# Patient Record
Sex: MALE | Race: WHITE | NOT HISPANIC OR LATINO | Employment: FULL TIME | URBAN - METROPOLITAN AREA
[De-identification: names, ages, dates, MRNs, and addresses within clinical notes are randomized per-mention and may not be internally consistent; named-entity substitution may affect disease eponyms.]

---

## 2016-01-12 LAB
LEFT EYE DIABETIC RETINOPATHY: NORMAL
RIGHT EYE DIABETIC RETINOPATHY: NORMAL

## 2017-11-24 LAB
LEFT EYE DIABETIC RETINOPATHY: NORMAL
RIGHT EYE DIABETIC RETINOPATHY: NORMAL

## 2018-01-02 ENCOUNTER — HOSPITAL ENCOUNTER (EMERGENCY)
Facility: HOSPITAL | Age: 58
Discharge: HOME | End: 2018-01-02
Attending: EMERGENCY MEDICINE | Admitting: EMERGENCY MEDICINE
Payer: COMMERCIAL

## 2018-01-02 ENCOUNTER — APPOINTMENT (EMERGENCY)
Dept: RADIOLOGY | Facility: HOSPITAL | Age: 58
End: 2018-01-02
Payer: COMMERCIAL

## 2018-01-02 VITALS
BODY MASS INDEX: 26.95 KG/M2 | HEART RATE: 89 BPM | OXYGEN SATURATION: 99 % | DIASTOLIC BLOOD PRESSURE: 86 MMHG | HEIGHT: 74 IN | WEIGHT: 210 LBS | RESPIRATION RATE: 20 BRPM | TEMPERATURE: 97.9 F | SYSTOLIC BLOOD PRESSURE: 142 MMHG

## 2018-01-02 DIAGNOSIS — J06.9 URI (UPPER RESPIRATORY INFECTION): ICD-10-CM

## 2018-01-02 DIAGNOSIS — S09.90XA CLOSED HEAD INJURY: ICD-10-CM

## 2018-01-02 DIAGNOSIS — S02.2XXA NASAL BONE FRACTURES: Primary | ICD-10-CM

## 2018-01-02 DIAGNOSIS — R55 SYNCOPE: ICD-10-CM

## 2018-01-02 LAB
ANION GAP SERPL CALCULATED.3IONS-SCNC: 9 MMOL/L (ref 4–13)
ATRIAL RATE: 84 BPM
BASOPHILS # BLD AUTO: 0.04 THOUSANDS/ΜL (ref 0–0.1)
BASOPHILS NFR BLD AUTO: 0 % (ref 0–1)
BUN SERPL-MCNC: 13 MG/DL (ref 5–25)
CALCIUM SERPL-MCNC: 9.8 MG/DL (ref 8.3–10.1)
CHLORIDE SERPL-SCNC: 99 MMOL/L (ref 100–108)
CO2 SERPL-SCNC: 26 MMOL/L (ref 21–32)
CREAT SERPL-MCNC: 1.08 MG/DL (ref 0.6–1.3)
EOSINOPHIL # BLD AUTO: 0.1 THOUSAND/ΜL (ref 0–0.61)
EOSINOPHIL NFR BLD AUTO: 1 % (ref 0–6)
ERYTHROCYTE [DISTWIDTH] IN BLOOD BY AUTOMATED COUNT: 13.3 % (ref 11.6–15.1)
GFR SERPL CREATININE-BSD FRML MDRD: 77 ML/MIN/1.73SQ M
GLUCOSE SERPL-MCNC: 161 MG/DL (ref 65–140)
HCT VFR BLD AUTO: 42.7 % (ref 36.5–49.3)
HGB BLD-MCNC: 15.1 G/DL (ref 12–17)
LYMPHOCYTES # BLD AUTO: 1.66 THOUSANDS/ΜL (ref 0.6–4.47)
LYMPHOCYTES NFR BLD AUTO: 15 % (ref 14–44)
MCH RBC QN AUTO: 31.3 PG (ref 26.8–34.3)
MCHC RBC AUTO-ENTMCNC: 35.4 G/DL (ref 31.4–37.4)
MCV RBC AUTO: 89 FL (ref 82–98)
MONOCYTES # BLD AUTO: 0.92 THOUSAND/ΜL (ref 0.17–1.22)
MONOCYTES NFR BLD AUTO: 9 % (ref 4–12)
NEUTROPHILS # BLD AUTO: 8.04 THOUSANDS/ΜL (ref 1.85–7.62)
NEUTS SEG NFR BLD AUTO: 75 % (ref 43–75)
NRBC BLD AUTO-RTO: 0 /100 WBCS
P AXIS: 47 DEGREES
PLATELET # BLD AUTO: 266 THOUSANDS/UL (ref 149–390)
PMV BLD AUTO: 9.7 FL (ref 8.9–12.7)
POTASSIUM SERPL-SCNC: 4.2 MMOL/L (ref 3.5–5.3)
PR INTERVAL: 146 MS
QRS AXIS: 69 DEGREES
QRSD INTERVAL: 86 MS
QT INTERVAL: 336 MS
QTC INTERVAL: 397 MS
RBC # BLD AUTO: 4.82 MILLION/UL (ref 3.88–5.62)
SODIUM SERPL-SCNC: 134 MMOL/L (ref 136–145)
T WAVE AXIS: 35 DEGREES
VENTRICULAR RATE: 84 BPM
WBC # BLD AUTO: 10.78 THOUSAND/UL (ref 4.31–10.16)

## 2018-01-02 PROCEDURE — 70486 CT MAXILLOFACIAL W/O DYE: CPT

## 2018-01-02 PROCEDURE — 71046 X-RAY EXAM CHEST 2 VIEWS: CPT

## 2018-01-02 PROCEDURE — 93005 ELECTROCARDIOGRAM TRACING: CPT | Performed by: EMERGENCY MEDICINE

## 2018-01-02 PROCEDURE — 84484 ASSAY OF TROPONIN QUANT: CPT

## 2018-01-02 PROCEDURE — 85025 COMPLETE CBC W/AUTO DIFF WBC: CPT | Performed by: EMERGENCY MEDICINE

## 2018-01-02 PROCEDURE — 93005 ELECTROCARDIOGRAM TRACING: CPT

## 2018-01-02 PROCEDURE — 36415 COLL VENOUS BLD VENIPUNCTURE: CPT | Performed by: EMERGENCY MEDICINE

## 2018-01-02 PROCEDURE — 80048 BASIC METABOLIC PNL TOTAL CA: CPT | Performed by: EMERGENCY MEDICINE

## 2018-01-02 PROCEDURE — 99285 EMERGENCY DEPT VISIT HI MDM: CPT

## 2018-01-02 PROCEDURE — 96361 HYDRATE IV INFUSION ADD-ON: CPT

## 2018-01-02 PROCEDURE — 96360 HYDRATION IV INFUSION INIT: CPT

## 2018-01-02 PROCEDURE — 70450 CT HEAD/BRAIN W/O DYE: CPT

## 2018-01-02 RX ORDER — 0.9 % SODIUM CHLORIDE 0.9 %
3 VIAL (ML) INJECTION AS NEEDED
Status: DISCONTINUED | OUTPATIENT
Start: 2018-01-02 | End: 2018-01-02 | Stop reason: HOSPADM

## 2018-01-02 RX ORDER — ACETAMINOPHEN 325 MG/1
650 TABLET ORAL ONCE
Status: COMPLETED | OUTPATIENT
Start: 2018-01-02 | End: 2018-01-02

## 2018-01-02 RX ADMIN — SODIUM CHLORIDE 1000 ML: 0.9 INJECTION, SOLUTION INTRAVENOUS at 16:42

## 2018-01-02 RX ADMIN — ACETAMINOPHEN 650 MG: 325 TABLET, FILM COATED ORAL at 16:38

## 2018-01-02 NOTE — ED ATTENDING ATTESTATION
Melina Go DO, saw and evaluated the patient  I have discussed the patient with the resident/non-physician practitioner and agree with the resident's/non-physician practitioner's findings, Plan of Care, and MDM as documented in the resident's/non-physician practitioner's note, except where noted  All available labs and Radiology studies were reviewed  At this point I agree with the current assessment done in the Emergency Department  I have conducted an independent evaluation of this patient a history and physical is as follows:    57 yo male presents for evaluation of HA, pain to his nose after episode just PTA in which he lost consciousness after taking delsym for cough/cold symptoms which have been ongoing for past few days  Pt had shaking activity when he lost consciousness, but no reported post ictal period  Denies CP/SOB, leg pain or swelling, abd pain  Pt states same thing happened last year after taking delsym    Imp: syncope, likely related to recent URI and delsym plan: ECG, CT head/face  [facility]  Reassess        Critical Care Time  CritCare Time    Procedures

## 2018-01-02 NOTE — DISCHARGE INSTRUCTIONS
Facial Fracture   WHAT YOU NEED TO KNOW:   A facial fracture is a break in one or more of the bones in your face  The bones in your face include those around your eye, your cheekbones, and the bones of your nose and jaw  A facial fracture may also cause damage to nearby tissue  DISCHARGE INSTRUCTIONS:   Medicines:   · Decongestant medicine:  Decongestants help decrease swelling in your nose and sinuses  This medicine may also help you breathe easier  · Pain medicine: You may be given a prescription medicine to decrease pain  Do not wait until the pain is severe before you take this medicine  · Steroid medicine: This medicine helps decrease swelling in your face  · Antibiotic medicine:  Antibiotic medicine helps treat an infection caused by bacteria  This medicine may be given if you have an open wound  · Take your medicine as directed  Contact your healthcare provider if you think your medicine is not helping or if you have side effects  Tell him or her if you are allergic to any medicine  Keep a list of the medicines, vitamins, and herbs you take  Include the amounts, and when and why you take them  Bring the list or the pill bottles to follow-up visits  Carry your medicine list with you in case of an emergency  Follow up with your healthcare provider as directed:  Write down your questions so you remember to ask them during your visits  Nutrition:  You may not be able to eat solid food for a period of time  You may first be started on a liquid diet  Examples of liquids you may be able to have include, water, broth, gelatin, apple juice, or lemon-lime soda pop  After a few days, you may be allowed to eat soft foods, such as applesauce, bananas, cooked cereal, cottage cheese, pudding, and yogurt  Ask for more information about the type of foods you can eat  Rehabilitation:  If you had surgery to fix your facial fracture, you may need oral and facial rehabilitation   This is done to restore normal use and movement of your facial muscles  Ask for more information about rehabilitation  Help prevent a facial fracture:   · Wear a helmet when you ride a bicycle or a motorcycle  · Wear a seatbelt at all times when you are inside a motor vehicle  · Wear protective headgear and eyewear during sporting activities  Self-care:   · Apply ice:  Ice helps decrease swelling and pain  Ice may also help prevent tissue damage  Use an ice pack or put crushed ice in a plastic bag  Cover it with a towel and place it on your face for 15 to 20 minutes every hour as directed  · Keep your head elevated:  Keep you head above the level of your heart as often as you can  This will help decrease swelling and pain  Prop your head on pillows or blankets to keep it elevated comfortably  · Avoid putting pressure on your face:      ¨ Do not sleep on the injured side of your face  Pressure on the area of your injury may cause further damage  ¨ Sneeze with your mouth open to decrease pressure on your broken facial bones  Too much pressure from a sneeze may cause your broken bones to move and cause more damage  ¨ Try not to blow your nose because it may cause more damage if you have a fracture near your eye  The pressure from blowing your nose may pinch the nerve of your eye and cause permanent damage  · Clean your mouth carefully: It may be hard to clean your teeth if have an injury or fracture near your mouth  You will be shown the best way to do this so you do not hurt yourself  A water pick or a child-sized soft toothbrush may work well to clean your mouth  Contact your healthcare provider if:   · You have a fever  · You have double vision or you suddenly have problems with your eyesight  · You feel dizzy or confused  · Your stitches or staples come apart  · Your surgery site is swollen, red, or has pus coming from it  · You have questions or concerns about your condition or care    Seek care immediately or call 911 if:   · You have clear or pinkish fluid draining from your nose or mouth  · You have numbness or worsening pain in your face  · You have swelling around your eye  · You suddenly have trouble speaking or breathing  · Your eye or eyes bulge farther than their normal position  · Your surgery site is bleeding  · You suddenly feel lightheaded and short of breath  · You have chest pain when you take a deep breath or cough  You may cough up blood  · Your arm or leg feels warm, tender, and painful  It may look swollen and red  © 2017 [employer]  Information is for End User's use only and may not be sold, redistributed or otherwise used for commercial purposes  All illustrations and images included in CareNotes® are the copyrighted property of [employer] , [employer] Bitt[employer]  or [employer]  The above information is an  only  It is not intended as medical advice for individual conditions or treatments  Talk to your doctor, nurse or pharmacist before following any medical regimen to see if it is safe and effective for you  Head Injury   WHAT YOU NEED TO KNOW:   A head injury is most often caused by a blow to the head  This may occur from a fall, bicycle injury, sports injury, being struck in the head, or a motor vehicle accident  DISCHARGE INSTRUCTIONS:   Call 911 or have someone else call for any of the following:   · You cannot be woken  · You have a seizure  · You stop responding to others or you faint  · You have blurry or double vision  · Your speech becomes slurred or confused  · You have arm or leg weakness, loss of feeling, or new problems with coordination  · Your pupils are larger than usual or one pupil is a different size than the other  · You have blood or clear fluid coming out of your ears or nose  Return to the emergency department if:   · You have repeated or forceful vomiting      · You feel confused  · Your headache gets worse or becomes severe  · You or someone caring for you notices that you are harder to wake than usual   Contact your healthcare provider if:   · Your symptoms last longer than 6 weeks after the injury  · You have questions or concerns about your condition or care  Medicines:   · Acetaminophen  decreases pain  Acetaminophen is available without a doctor's order  Ask how much to take and how often to take it  Follow directions  Acetaminophen can cause liver damage if not taken correctly  · Take your medicine as directed  Contact your healthcare provider if you think your medicine is not helping or if you have side effects  Tell him or her if you are allergic to any medicine  Keep a list of the medicines, vitamins, and herbs you take  Include the amounts, and when and why you take them  Bring the list or the pill bottles to follow-up visits  Carry your medicine list with you in case of an emergency  Self-care:   · Rest  or do quiet activities for 24 to 48 hours  Limit your time watching TV, using the computer, or doing tasks that require a lot of thinking  Slowly return to your normal activities as directed  Do not play sports or do activities that may cause you to get hit in the head  Ask your healthcare provider when you can return to sports  · Apply ice  on your head for 15 to 20 minutes every hour or as directed  Use an ice pack, or put crushed ice in a plastic bag  Cover it with a towel before you apply it to your skin  Ice helps prevent tissue damage and decreases swelling and pain  · Have someone stay with you for 24 hours  or as directed  This person can monitor you for complications and call [phone]  When you are awake the person should ask you a few questions to see if you are thinking clearly  An example would be to ask your name or your address  Prevent another head injury:   · Wear a helmet that fits properly    Do this when you play sports, or ride a bike, scooter, or skateboard  Helmets help decrease your risk of a serious head injury  Talk to your healthcare provider about other ways you can protect yourself if you play sports  · Wear your seat belt every time you are in a car  This helps to decrease your risk for a head injury if you are in a car accident  Follow up with your healthcare provider as directed:  Write down your questions so you remember to ask them during your visits  © 2017 [employer]  Information is for End User's use only and may not be sold, redistributed or otherwise used for commercial purposes  All illustrations and images included in CareNotes® are the copyrighted property of [employer]  or [employer]  The above information is an  only  It is not intended as medical advice for individual conditions or treatments  Talk to your doctor, nurse or pharmacist before following any medical regimen to see if it is safe and effective for you  Syncope   WHAT YOU NEED TO KNOW:   Syncope is also called fainting or passing out  Syncope is a sudden, temporary loss of consciousness, followed by a fall from a standing or sitting position  Syncope ranges from not serious to a sign of a more serious condition that needs to be treated  You can control some health conditions that cause syncope  Your healthcare providers can help you create a plan to manage syncope and prevent episodes  DISCHARGE INSTRUCTIONS:   Seek care immediately if:   · You are bleeding because you hit your head when you fainted  · You suddenly have double vision, difficulty speaking, numbness, and cannot move your arms or legs  · You have chest pain and trouble breathing  · You vomit blood or material that looks like coffee grounds  · You see blood in your bowel movement  Contact your healthcare provider if:   · You have new or worsening symptoms  · You have another syncope episode      · You have a headache, fast heartbeat, or feel too dizzy to stand up  · You have questions or concerns about your condition or care  Follow up with your healthcare provider as directed:  Write down your questions so you remember to ask them during your visits  Manage syncope:   · Keep a record of your syncope episodes  Include your symptoms and your activity before and after the episode  The record can help your healthcare provider find the cause of your syncope and help you manage episodes  · Sit or lie down when needed  This includes when you feel dizzy, your throat is getting tight, and your vision changes  Raise your legs above the level of your heart  · Take slow, deep breaths if you start to breathe faster with anxiety or fear  This can help decrease dizziness and the feeling that you might faint  · Check your blood pressure often  This is important if you take medicine to lower your blood pressure  Check your blood pressure when you are lying down and when you are standing  Ask how often to check during the day  Keep a record of your blood pressure numbers  Your healthcare provider may use the record to help plan your treatment  Prevent a syncope episode:   · Move slowly and let yourself get used to one position before you move to another position  This is very important when you change from a lying or sitting position to a standing position  Take some deep breaths before you stand up from a lying position  Stand up slowly  Sudden movements may cause a fainting spell  Sit on the side of the bed or couch for a few minutes before you stand up  If you are on bedrest, try to be upright for about 2 hours each day, or as directed  Do not lock your legs if you are standing for a long period of time  Move your legs and bend your knees to keep blood flowing  · Follow your healthcare provider's recommendations  Your provider may  recommend that you drink more liquids to prevent dehydration   You may also need to have more salt to keep your blood pressure from dropping too low and causing syncope  Your provider will tell you how much liquid and sodium to have each day  · Watch for signs of low blood sugar  These include hunger, nervousness, sweating, and fast or fluttery heartbeats  Talk with your healthcare provider about ways to keep your blood sugar level steady  · Do not strain if you are constipated  You may faint if you strain to have a bowel movement  Walking is the best way to get your bowels moving  Eat foods high in fiber to make it easier to have a bowel movement  Good examples are high-fiber cereals, beans, vegetables, and whole-grain breads  Prune juice may help make bowel movements softer  · Be careful in hot weather  Heat can cause a syncope episode  Limit activity done outside on hot days  Physical activity in hot weather can lead to dehydration  This can cause an episode  © 2017 [address] [employer] Information is for End User's use only and may not be sold, redistributed or otherwise used for commercial purposes  All illustrations and images included in CareNotes® are the copyrighted property of [employer]  or [employer]  The above information is an  only  It is not intended as medical advice for individual conditions or treatments  Talk to your doctor, nurse or pharmacist before following any medical regimen to see if it is safe and effective for you  Upper Respiratory Infection   WHAT YOU NEED TO KNOW:   An upper respiratory infection is also called the common cold  It is an infection that can affect your nose, throat, ears, and sinuses  For healthy people, the common cold is usually not serious and does not need special treatment  Cold symptoms are usually worst for the first 3 to 5 days  Most people get better in 7 to 14 days  You may continue to cough for 2 to 3 weeks  Colds are caused by viruses and do not get better with antibiotics  DISCHARGE INSTRUCTIONS:   Return to the emergency department if:   · You have chest pain or trouble breathing  Contact your healthcare provider if:   · You have a fever over 102ºF (39°C)  · Your sore throat gets worse or you see white or yellow spots in your throat  · Your symptoms get worse after 3 to 5 days or your cold is not better in 14 days  · You have a rash anywhere on your skin  · You have large, tender lumps in your neck  · You have thick, green or yellow drainage from your nose  · You cough up thick yellow, green, or bloody mucus  · You have vomiting for more than 24 hours and cannot keep fluids down  · You have a bad earache  · You have questions or concerns about your condition or care  Medicines: You may need any of the following:  · Decongestants  help reduce nasal congestion and help you breathe more easily  If you take decongestant pills, they may make you feel restless or cause problems with your sleep  Do not use decongestant sprays for more than a few days  · Cough suppressants  help reduce coughing  Ask your healthcare provider which type of cough medicine is best for you  · NSAIDs , such as ibuprofen, help decrease swelling, pain, and fever  NSAIDs can cause stomach bleeding or kidney problems in certain people  If you take blood thinner medicine, always ask your healthcare provider if NSAIDs are safe for you  Always read the medicine label and follow directions  · Acetaminophen  decreases pain and fever  It is available without a doctor's order  Ask how much to take and how often to take it  Follow directions  Read the labels of all other medicines you are using to see if they also contain acetaminophen, or ask your doctor or pharmacist  Acetaminophen can cause liver damage if not taken correctly  Do not use more than 4 grams (4,000 milligrams) total of acetaminophen in one day  · Take your medicine as directed    Contact your healthcare provider if you think your medicine is not helping or if you have side effects  Tell him or her if you are allergic to any medicine  Keep a list of the medicines, vitamins, and herbs you take  Include the amounts, and when and why you take them  Bring the list or the pill bottles to follow-up visits  Carry your medicine list with you in case of an emergency  Follow up with your healthcare provider as directed:  Write down your questions so you remember to ask them during your visits  Self-care:   · Rest as much as possible  Slowly start to do more each day  · Drink more liquids as directed  Liquids will help thin and loosen mucus so you can cough it up  Liquids will also help prevent dehydration  Liquids that help prevent dehydration include water, fruit juice, and broth  Do not drink liquids that contain caffeine  Caffeine can increase your risk for dehydration  Ask your healthcare provider how much liquid to drink each day  · Soothe a sore throat  Gargle with warm salt water  This helps your sore throat feel better  Make salt water by dissolving ¼ teaspoon salt in 1 cup warm water  You may also suck on hard candy or throat lozenges  You may use a sore throat spray  · Use a humidifier or vaporizer  Use a cool mist humidifier or a vaporizer to increase air moisture in your home  This may make it easier for you to breathe and help decrease your cough  · Use saline nasal drops as directed  These help relieve congestion  · Apply petroleum-based jelly around the outside of your nostrils  This can decrease irritation from blowing your nose  · Do not smoke  Nicotine and other chemicals in cigarettes and cigars can make your symptoms worse  They can also cause infections such as bronchitis or pneumonia  Ask your healthcare provider for information if you currently smoke and need help to quit  E-cigarettes or smokeless tobacco still contain nicotine   Talk to your healthcare provider before you use these products  Prevent spreading your cold to others:   · Try to stay away from other people during the first 2 to 3 days of your cold when it is more easily spread  · Do not share food or drinks  · Do not share hand towels with household members  · Wash your hands often, especially after you blow your nose  Turn away from other people and cover your mouth and nose with a tissue when you sneeze or cough  © 2017 [address] Information is for End User's use only and may not be sold, redistributed or otherwise used for commercial purposes  All illustrations and images included in CareNotes® are the copyrighted property of [employer]  or [employer]  The above information is an  only  It is not intended as medical advice for individual conditions or treatments  Talk to your doctor, nurse or pharmacist before following any medical regimen to see if it is safe and effective for you

## 2018-01-02 NOTE — ED PROVIDER NOTES
History  Chief Complaint   Patient presents with    Syncope     pt has a sycopal episode after taking delsym today  Pt family stated that he had seizure like activity,  HPI    None     No past medical history on file  No past surgical history on file  No family history on file  I have reviewed and agree with the history as documented  Social History   Substance Use Topics    Smoking status: Not on file    Smokeless tobacco: Not on file    Alcohol use Not on file      Review of Systems    Physical Exam  ED Triage Vitals   Temp Pulse Resp BP SpO2   -- -- -- -- --      Temp src Heart Rate Source Patient Position - Orthostatic VS BP Location FiO2 (%)   -- -- -- -- --      Pain Score       --         Physical Exam    ED Medications  Medications - No data to display    Diagnostic Studies  Results Reviewed     None        No orders to display     Procedures  Procedures    Phone Consults  ED Phone Contact    ED Course    A/P: This is a 56 y o  male who presents to the ED for evaluation of ***    [facility]  CritCare Time    Disposition  Final diagnoses:   None     ED Disposition     None      Follow-up Information    None       Patient's Medications    No medications on file     No discharge procedures on file  ED Provider  Attending physically available and evaluated Jerel Page I managed the patient along with the ED Attending      Electronically Signed by

## 2018-01-02 NOTE — ED PROVIDER NOTES
History  Chief Complaint   Patient presents with    Syncope     pt has a sycopal episode after taking delsym today  Pt family stated that he had seizure like activity,       70-year-old male, history of diabetes, presents to the emergency room after a syncopal episode that occurred just prior to arrival  Patient states that he has had cough and congestion for the last few days and had a syncopal episode after taking still some cold and cough  He states that he was in the bathroom at the time and hit his head off the sink  Family reports shaking activity that lasted a few seconds and resolved on its own  Upon EMS arrival, patient was back to normal mental status  He complains of having a mild headache and nasal pain after the fall  States that he has not eaten or drank much in the last couple of days secondary to cold-like symptoms  He denies any neck pain, numbness/tingling/weakness of his extremities, vision changes, anticoagulation use, abdominal pain, chest pain, or nausea/vomiting  Up-to-date on tetanus  No other complaints at this time  He reports that he had a similar episode 1 year ago when he took tell some coughing:  Medicine in the past         History provided by:  Patient  Syncope   Episode history:  Single  Most recent episode: Today  Timing:  Unable to specify  Progression:  Resolved  Chronicity:  New  Context: medication change and standing up    Witnessed: no    Relieved by:  Nothing  Worsened by:  Medication  Ineffective treatments:  None tried  Associated symptoms: dizziness, headaches and recent fall    Associated symptoms: no chest pain, no confusion, no difficulty breathing, no fever, no focal weakness, no nausea, no shortness of breath, no vomiting and no weakness    Risk factors: no coronary artery disease and no seizures        None       Past Medical History:   Diagnosis Date    Diabetes mellitus ([facility] [address] )        History reviewed  No pertinent surgical history  History reviewed   No pertinent family history  I have reviewed and agree with the history as documented  Social History   Substance Use Topics    Smoking status: Former Smoker    Smokeless tobacco: Never Used    Alcohol use No        Review of Systems   Constitutional: Negative for chills and fever  HENT: Negative for congestion, ear pain and sore throat  Eyes: Negative for pain and visual disturbance  Respiratory: Negative for cough, shortness of breath and wheezing  Cardiovascular: Positive for syncope  Negative for chest pain and leg swelling  Gastrointestinal: Negative for abdominal pain, diarrhea, nausea and vomiting  Genitourinary: Negative for dysuria, frequency, hematuria and urgency  Musculoskeletal: Negative for neck pain and neck stiffness  Skin: Negative for rash and wound  Neurological: Positive for dizziness and headaches  Negative for focal weakness, weakness and numbness  Psychiatric/Behavioral: Negative for agitation and confusion  All other systems reviewed and are negative  Physical Exam  ED Triage Vitals [01/02/18 1619]   Temperature Pulse Respirations Blood Pressure SpO2   97 9 °F (36 6 °C) 89 20 142/86 99 %      Temp Source Heart Rate Source Patient Position - Orthostatic VS BP Location FiO2 (%)   Tympanic Monitor Lying Left arm --      Pain Score       2           Orthostatic Vital Signs  Vitals:    01/02/18 1619   BP: 142/86   Pulse: 89   Patient Position - Orthostatic VS: Lying       Physical Exam   Constitutional: He is oriented to person, place, and time  He appears well-developed and well-nourished  HENT:   Head: Normocephalic  Small hematoma with overlying abrasion on the right forehead  Dried blood in bilateral nares  Tenderness noted over the nasal bridge  Eyes: EOM are normal  Pupils are equal, round, and reactive to light  Neck: Normal range of motion  Neck supple  No midline C-spine tenderness  Full range of motion without any pain    C-collar cleared by me    Cardiovascular: Normal rate and regular rhythm  Pulmonary/Chest: Effort normal and breath sounds normal  No respiratory distress  He has no wheezes  He has no rales  Abdominal: Soft  Bowel sounds are normal  He exhibits no distension  There is no tenderness  Musculoskeletal: Normal range of motion  He exhibits no edema  Neurological: He is alert and oriented to person, place, and time  No focal deficits   Skin: Skin is warm and dry  Nursing note and vitals reviewed  ED Medications  Medications   sodium chloride 0 9 % bolus 1,000 mL (0 mL Intravenous Stopped 1/2/18 1823)   acetaminophen (TYLENOL) tablet 650 mg (650 mg Oral Given 1/2/18 1638)       Diagnostic Studies  Results Reviewed     Procedure Component Value Units Date/Time    Basic metabolic panel [[id number]]  (Abnormal) Collected:  01/02/18 1635    Lab Status:  Final result Specimen:  Blood from Arm, Right Updated:  01/02/18 1705     Sodium 134 (L) mmol/L      Potassium 4 2 mmol/L      Chloride 99 (L) mmol/L      CO2 26 mmol/L      Anion Gap 9 mmol/L      BUN 13 mg/dL      Creatinine 1 08 mg/dL      Glucose 161 (H) mg/dL      Calcium 9 8 mg/dL      eGFR 77 ml/min/1 73sq m     Narrative:         National Kidney Disease Education Program recommendations are as follows:  GFR calculation is accurate only with a steady state creatinine  Chronic Kidney disease less than 60 ml/min/1 73 sq  meters  Kidney failure less than 15 ml/min/1 73 sq  meters      CBC and differential [[id number]]  (Abnormal) Collected:  01/02/18 1635    Lab Status:  Final result Specimen:  Blood from Arm, Right Updated:  01/02/18 1654     WBC 10 78 (H) Thousand/uL      RBC 4 82 Million/uL      Hemoglobin 15 1 g/dL      Hematocrit 42 7 %      MCV 89 fL      MCH 31 3 pg      MCHC 35 4 g/dL      RDW 13 3 %      MPV 9 7 fL      Platelets 552 Thousands/uL      nRBC 0 /100 WBCs      Neutrophils Relative 75 %      Lymphocytes Relative 15 %      Monocytes Relative 9 % Eosinophils Relative 1 %      Basophils Relative 0 %      Neutrophils Absolute 8 04 (H) Thousands/µL      Lymphocytes Absolute 1 66 Thousands/µL      Monocytes Absolute 0 92 Thousand/µL      Eosinophils Absolute 0 10 Thousand/µL      Basophils Absolute 0 04 Thousands/µL                  X-ray chest 2 views   ED Interpretation by Niecy Lowery DO (01/02 1801)   NAP      Final Result by Emiliano Su MD (01/02 1756)      No active pulmonary disease  Workstation performed: [id number]         CT facial bones wo contrast   Final Result by Emiliano Su MD (01/02 1730)      Bilateral comminuted nasal bone fractures with slight leftward displacement of the fracture fragments  Nasal septum is also fractured posteriorly image 5/65  Right frontal soft tissue scalp swelling without underlying fracture  Workstation performed: [id number]         CT head wo contrast   Final Result by Emiliano Su MD (01/02 1724)      No acute intracranial abnormality  Bilateral comminuted nasal bone fractures with slight leftward displacement of the fracture fragments  Right frontal soft tissue scalp swelling without underlying fracture           Workstation performed: [id number]               Procedures  ECG 12 Lead Documentation  Date/Time: 1/2/2018 5:54 PM  Performed by: Brandy Cardenas  Authorized by: Cayden Hernandez     Indications / Diagnosis:  Syncope  ECG reviewed by me, the ED Provider: yes    Patient location:  ED  Previous ECG:     Previous ECG:  Unavailable  Rate:     ECG rate:  84    ECG rate assessment: normal    Rhythm:     Rhythm: sinus rhythm    Ectopy:     Ectopy: none    QRS:     QRS axis:  Normal  ST segments:     ST segments:  Normal  T waves:     T waves: normal            Phone Consults  ED Phone Contact    ED Course  ED Course as of Jan 02 2354   Tue Jan 02, 2018   1743 No septal hematoma on exam  CT facial bones wo contrast   1800 Trop 0 00 MDM  Number of Diagnoses or Management Options  Closed head injury: new and requires workup  Nasal bone fractures: new and requires workup  Syncope: new and requires workup  URI (upper respiratory infection): new and requires workup  Diagnosis management comments: Patient with headache and nasal pain status post syncopal episode  Will do cardiac workup, EKG to rule out arrhythmia, and CT head/face to rule out injury  Patient reevaluated and feels improved  Patient updated on results of tests  Discharge instructions given including follow-up, and return precautions  Patient demonstrates verbal understanding and agrees with plan  Amount and/or Complexity of Data Reviewed  Clinical lab tests: ordered and reviewed  Tests in the radiology section of CPT®: ordered and reviewed  Tests in the medicine section of CPT®: ordered and reviewed  Discussion of test results with the performing providers: yes  Decide to obtain previous medical records or to obtain history from someone other than the patient: yes  Obtain history from someone other than the patient: yes  Review and summarize past medical records: yes  Discuss the patient with other providers: yes  Independent visualization of images, tracings, or specimens: yes    Patient Progress  Patient progress: improved    CritCare Time    Disposition  Final diagnoses:   Nasal bone fractures   Closed head injury   Syncope   URI (upper respiratory infection)     Time reflects when diagnosis was documented in both MDM as applicable and the Disposition within this note     Time User Action Codes Description Comment    1/2/2018  5:50 PM Dayne Corey Add [S02  2XXA] Nasal bone fractures     1/2/2018  5:50 PM Dayne Corey Add [S09 90XA] Closed head injury     1/2/2018  5:50 PM Dayne Corey Add [R55] Syncope     1/2/2018  5:51 PM Dayne Corey Add [J06 9] URI (upper respiratory infection)       ED Disposition ED Disposition Condition Comment    Discharge  Kian A Quick discharge to home/self care  Condition at discharge: Good        Follow-up Information     Follow up With Specialties Details Why Contact Info Additional Laney Porter MD Otolaryngology Call in 1 day for follow up as soon as possible  [address] [facility]       Tory Izquierdo MD Internal Medicine Call in 1 day For follow-up  [address]  [place]  [address] Emergency Department Emergency Medicine Go to Immediately for any new or worsening symptoms  [address]  [phone]  ED, [address] [place] [address], [facility]        There are no discharge medications for this patient  No discharge procedures on file  ED Provider  Attending physically available and evaluated Ezequiel Agee I managed the patient along with the ED Attending      Electronically Signed by         Shantel Hackett DO  Resident  01/02/18 9810

## 2018-01-03 LAB
SPECIMEN SOURCE: NORMAL
TROPONIN I BLD-MCNC: 0 NG/ML (ref 0–0.08)

## 2018-08-02 ENCOUNTER — OFFICE VISIT (OUTPATIENT)
Dept: INTERNAL MEDICINE CLINIC | Facility: CLINIC | Age: 58
End: 2018-08-02
Payer: COMMERCIAL

## 2018-08-02 VITALS
HEART RATE: 70 BPM | DIASTOLIC BLOOD PRESSURE: 78 MMHG | RESPIRATION RATE: 18 BRPM | TEMPERATURE: 98.3 F | HEIGHT: 74 IN | BODY MASS INDEX: 25.77 KG/M2 | OXYGEN SATURATION: 96 % | SYSTOLIC BLOOD PRESSURE: 124 MMHG | WEIGHT: 200.8 LBS

## 2018-08-02 DIAGNOSIS — R21 LOCALIZED RASH: ICD-10-CM

## 2018-08-02 DIAGNOSIS — E78.2 MIXED HYPERLIPIDEMIA: ICD-10-CM

## 2018-08-02 DIAGNOSIS — E11.9 TYPE 2 DIABETES MELLITUS WITHOUT COMPLICATION, WITHOUT LONG-TERM CURRENT USE OF INSULIN (HCC): Primary | ICD-10-CM

## 2018-08-02 PROCEDURE — 99204 OFFICE O/P NEW MOD 45 MIN: CPT | Performed by: INTERNAL MEDICINE

## 2018-08-02 RX ORDER — ECHINACEA 400 MG
CAPSULE ORAL
COMMUNITY
End: 2018-10-01

## 2018-08-02 RX ORDER — THERMOMETER, ELECTRONIC,ORAL
EACH MISCELLANEOUS
COMMUNITY
End: 2019-10-30

## 2018-08-02 RX ORDER — UREA 10 %
2 LOTION (ML) TOPICAL 2 TIMES DAILY PRN
COMMUNITY

## 2018-08-02 NOTE — PROGRESS NOTES
Assessment/Plan:     Diagnoses and all orders for this visit:    Type 2 diabetes mellitus without complication, without long-term current use of insulin (HCC)  Comments:  BW ordered, check blood sugars periodically  t/c statin or asa at f/u visit  Orders:  -     Comprehensive metabolic panel; Future  -     Hemoglobin A1C; Future  -     Microalbumin / creatinine urine ratio  -     Lipid Panel with Direct LDL reflex; Future    Mixed hyperlipidemia  Comments:  LDL 180s in past, re-check now and will need statin(pt declines this)  Orders:  -     Comprehensive metabolic panel; Future  -     Lipid Panel with Direct LDL reflex; Future    Localized rash  Comments:  stop all topicals, liberal use of emollients  call if not improved  Orders:  -     mineral oil-hydrophilic petrolatum (AQUAPHOR) ointment; Apply topically 4 (four) times a day    Other orders  -     metFORMIN (GLUCOPHAGE) 1000 MG tablet; Take 1,000 mg by mouth 2 (two) times a day    -     calcium carbonate (OS-EDIE) 1250 (500 Ca) MG chewable tablet; Chew 2 tablets 2 (two) times a day as needed    -     tolnaftate (TINACTIN) 1 % cream; Apply topically  -     Flaxseed, Linseed, (FLAXSEED OIL) 1000 MG CAPS; Take by mouth          Subjective:      Patient ID: Shakeel Munson is a 66 y o  male  HPI     New to practice, here to establish care  Takes only metformin for DM, refused to take ACEI or other rx in past   Hx of hyperlipidemia, doesn't check BS at home but has meter and knows how to use  Last A1C, pt unsure of results  Declines any/all vaccines  Works full-time for [employer]     Has rash on top of both feet for last 1 year, no pain or itching  Has been using topical anti=fungal for last 1 year for rash with limited benefit(powder/spray)  No hx of eczema or psoriasis in past   No other complaints      Family History   Problem Relation Age of Onset    Diabetes Mother     Diabetes Sister     Diabetes Brother     Pancreatic cancer Sister    [place] Aneurysm Sister         vertebral aneurysm     Social History     Social History    Marital status: /Civil Union     Spouse name: N/A    Number of children: N/A    Years of education: N/A     Occupational History    Not on file  Social History Main Topics    Smoking status: Former Smoker     Types: Cigarettes    Smokeless tobacco: Never Used    Alcohol use No      Comment: 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)    Drug use: No    Sexual activity: Not on file     Other Topics Concern    Not on file     Social History Narrative    No narrative on file     History reviewed  No pertinent past medical history  Vitals:    08/02/18 1349   BP: 124/78   Pulse: 70   Resp: 18   Temp: 98 3 °F (36 8 °C)   TempSrc: Oral   SpO2: 96%   Weight: 91 1 kg (200 lb 12 8 oz)   Height: 6' 2" (1 88 m)     Body mass index is 25 78 kg/m²  Current Outpatient Prescriptions:     calcium carbonate (OS-EDIE) 1250 (500 Ca) MG chewable tablet, Chew 2 tablets 2 (two) times a day as needed  , Disp: , Rfl:     Flaxseed, Linseed, (FLAXSEED OIL) 1000 MG CAPS, Take by mouth, Disp: , Rfl:     metFORMIN (GLUCOPHAGE) 1000 MG tablet, Take 1,000 mg by mouth 2 (two) times a day  , Disp: , Rfl:     mineral oil-hydrophilic petrolatum (AQUAPHOR) ointment, Apply topically 4 (four) times a day, Disp: 420 g, Rfl: 0    tolnaftate (TINACTIN) 1 % cream, Apply topically, Disp: , Rfl:   Allergies   Allergen Reactions    Delsym [Dextromethorphan] Syncope     History reviewed  No pertinent surgical history  Review of Systems   Constitutional: Negative for fever  HENT: Negative for congestion  Eyes: Negative for photophobia  Respiratory: Negative for shortness of breath  Cardiovascular: Negative for chest pain  Gastrointestinal: Negative for abdominal pain  Genitourinary: Negative for difficulty urinating  Musculoskeletal: Negative for arthralgias  Skin: Positive for rash     Neurological: Negative for headaches  Psychiatric/Behavioral: Negative for dysphoric mood  Objective:      /78   Pulse 70   Temp 98 3 °F (36 8 °C) (Oral)   Resp 18   Ht 6' 2" (1 88 m)   Wt 91 1 kg (200 lb 12 8 oz)   SpO2 96%   BMI 25 78 kg/m²          Physical Exam   Constitutional: He is oriented to person, place, and time  He appears well-developed and well-nourished  HENT:   Head: Normocephalic and atraumatic  Mouth/Throat: Oropharynx is clear and moist    Eyes: Conjunctivae are normal  Pupils are equal, round, and reactive to light  Cardiovascular: Normal rate, regular rhythm and normal heart sounds  No murmur heard  Pulmonary/Chest: Effort normal and breath sounds normal  He has no wheezes  He has no rales  Abdominal: Soft  Bowel sounds are normal  There is no tenderness  Musculoskeletal: He exhibits no edema  Neurological: He is alert and oriented to person, place, and time  Skin: Rash noted  There is erythema  Flat, erythema on dorsum of both feet but with xerosis no tenderness   Psychiatric: He has a normal mood and affect  Vitals reviewed

## 2018-08-02 NOTE — PATIENT INSTRUCTIONS
1  Blood work & urine test  2  Check blood sugars periodically at home  3  Stop anti-fungal for feet and use aquaphor 3-4 times per day  4  Return in 4 weeks  5  Eye exam    Basic Carbohydrate Counting   AMBULATORY CARE:   Carbohydrate counting  is a way to plan your meals by counting the amount of carbohydrate in foods  Carbohydrates are the sugars, starches, and fiber found in fruit, grains, vegetables, and milk products  Carbohydrates increase your blood sugar levels  Carbohydrate counting can help you eat the right amount of carbohydrate to keep your blood sugar levels under control  What you need to know about planning meals using carbohydrate counting:  · A dietitian or healthcare provider will help you develop a healthy meal plan that works best for you  You will be taught how much carbohydrate to eat or drink for each meal and snack  Your meal plan will be based on your age, weight, usual food intake, and physical activity level  If you have diabetes, it will also include your blood sugar levels and diabetes medicine  Once you know how much carbohydrate you should eat, you can decide what type of food you want to eat  · You will need to know what foods contain carbohydrate and how much they contain  Keep track of the amount of carbohydrate in meals and snacks in order to follow your meal plan  Do not avoid carbohydrates or skip meals  Your blood sugar may fall too low if you do not eat enough carbohydrate or you skip meals  Foods that contain carbohydrate:   · Breads:  Each serving of food listed below contains about 15 g of carbohydrate   ¨ 1 slice of bread (1 ounce) or 1 flour or corn tortilla (6 inch)    ¨ ½ of a hamburger bun or ¼ of a large bagel (about 1 ounce)    ¨ 1 pancake (about 4 inches across and ¼ inch thick)    · Cereals and grains:  Serving sizes of ready-to-eat cereals vary  Look at the serving size and the total carbohydrate amount listed on the food label   Each serving of food listed below contains about 15 g of carbohydrate   ¨ ¾ cup of dry, unsweetened, ready-to-eat cereal or ¼ cup of low-fat granola     ¨ ½ cup of oatmeal or other cooked cereal     ¨ ? cup of cooked rice or pasta    · Starchy vegetables and beans:  Each serving of food listed below contains about 15 g of carbohydrate   ¨ ½ cup of corn, green peas, sweet potatoes, or mashed potatoes    ¨ ¼ of a large baked potato    ¨ ½ cup of beans, lentils, and peas (garbanzo, capps, kidney, white, split, black-eyed)    · Crackers and snacks:  Each serving of food listed below contains about 15 g of carbohydrate   ¨ 3 brenden cracker squares or 8 animal crackers     ¨ 6 saltine-type crackers    ¨ 3 cups of popcorn or ¾ ounce of pretzels, potato chips, or tortilla chips    · Fruit:  Each serving of food listed below contains about 15 g of carbohydrate   ¨ 1 small (4 ounce) piece of fresh fruit or ¾ to 1 cup of fresh fruit    ¨ ½ cup of canned or frozen fruit, packed in natural juice    ¨ ½ cup (4 ounces) of unsweetened fruit juice    ¨ 2 tablespoons of dried fruit    · Desserts or sugary foods:  Each serving of food listed below contains about 15 g of carbohydrate   ¨ 2-inch square unfrosted cake or brownie     ¨ 2 small cookies    ¨ ½ cup of ice cream, frozen yogurt, or nondairy frozen yogurt    ¨ ¼ cup of sherbet or sorbet    ¨ 1 tablespoon of regular syrup, jam, or jelly    ¨ 2 tablespoons of light syrup    · Milk and yogurt:  Foods from the milk group contain about 12 g of carbohydrate per serving  ¨ 1 cup of fat-free or low-fat milk    ¨ 1 cup of soy milk    ¨ ? cup of fat-free, yogurt sweetened with artificial sweetener    · Non-starchy vegetables:  Each serving contains about 5 g of carbohydrate   Three servings of non-starch vegetables count as 1 carbohydrate serving  ¨ ½ cup of cooked vegetables or 1 cup of raw vegetables   This includes beets, broccoli, cabbage, cauliflower, cucumber, mushrooms, tomatoes, and zucchini    ¨ ½ cup of vegetable juice  How to use carbohydrate counting to plan meals:   · Count carbohydrate amounts using serving sizes:      ¨ Pasta dinner example: You plan to have pasta, tossed salad, and an 8-ounce glass of milk  Your healthcare provider tells you that you may have 4 carbohydrate servings for dinner  One carbohydrate serving of pasta is ? cup  One cup of pasta will equal 3 carbohydrate servings  An 8-ounce glass of milk will count as 1 carbohydrate serving  These amounts of food would equal 4 carbohydrate servings  One cup of tossed salad does not count toward your carbohydrate servings  · Count carbohydrate amounts using food labels:  Find the total amount of carbohydrate in a packaged food by reading the food label  Food labels tell you the serving size of the food and the total carbohydrate amount in each serving  Find the serving size on the food label and then decide how many servings you will eat  Multiply the number of servings you plan to eat by the carbohydrate amount per serving  ¨ Granola bar snack example: Your meal plan allows you to have 2 carbohydrate servings (30 grams) of carbohydrate for a snack  You plan to eat 1 package of granola bars, which contains 2 bars  According to the food label, the serving size of food in this package is 1 bar  Each serving (1 bar) contains 25 grams of carbohydrate  The total amount of carbohydrate in this package of granola bars would be 50 g  Based on your meal plan, you should eat only 1 bar  Follow up with your healthcare provider as directed:  Write down your questions so you remember to ask them during your visits  © 2017 [employer] Information is for End User's use only and may not be sold, redistributed or otherwise used for commercial purposes  All illustrations and images included in CareNotes® are the copyrighted property of [employer]  or [employer]    The above information is an  only  It is not intended as medical advice for individual conditions or treatments  Talk to your doctor, nurse or pharmacist before following any medical regimen to see if it is safe and effective for you

## 2018-08-02 NOTE — PROGRESS NOTES
Diabetic Foot Exam    Patient's shoes and socks removed  Right Foot/Ankle   Right Foot Inspection  Skin Exam: skin intact, dry skin and erythema                          Toe Exam: ROM and strength within normal limits  Sensory       Monofilament testing: diminished  Vascular    The right DP pulse is 2+  The right PT pulse is 2+  Left Foot/Ankle  Left Foot Inspection  Skin Exam: skin intact, dry skin and erythema                         Toe Exam: ROM and strength within normal limits                   Sensory       Monofilament: diminished  Vascular    The left DP pulse is 2+  The left PT pulse is 2+  Assign Risk Category:  No deformity present; No loss of protective sensation;  No weak pulses       Risk: 0

## 2018-08-24 ENCOUNTER — APPOINTMENT (OUTPATIENT)
Dept: LAB | Facility: CLINIC | Age: 58
End: 2018-08-24
Payer: COMMERCIAL

## 2018-08-24 DIAGNOSIS — E78.2 MIXED HYPERLIPIDEMIA: ICD-10-CM

## 2018-08-24 DIAGNOSIS — E11.9 TYPE 2 DIABETES MELLITUS WITHOUT COMPLICATION, WITHOUT LONG-TERM CURRENT USE OF INSULIN (HCC): ICD-10-CM

## 2018-08-24 LAB
ALBUMIN SERPL BCP-MCNC: 4 G/DL (ref 3.5–5)
ALP SERPL-CCNC: 80 U/L (ref 46–116)
ALT SERPL W P-5'-P-CCNC: 49 U/L (ref 12–78)
ANION GAP SERPL CALCULATED.3IONS-SCNC: 7 MMOL/L (ref 4–13)
AST SERPL W P-5'-P-CCNC: 27 U/L (ref 5–45)
BILIRUB SERPL-MCNC: 0.4 MG/DL (ref 0.2–1)
BUN SERPL-MCNC: 15 MG/DL (ref 5–25)
CALCIUM SERPL-MCNC: 9.9 MG/DL (ref 8.3–10.1)
CHLORIDE SERPL-SCNC: 101 MMOL/L (ref 100–108)
CHOLEST SERPL-MCNC: 222 MG/DL (ref 50–200)
CO2 SERPL-SCNC: 28 MMOL/L (ref 21–32)
CREAT SERPL-MCNC: 1.02 MG/DL (ref 0.6–1.3)
CREAT UR-MCNC: 145 MG/DL
EST. AVERAGE GLUCOSE BLD GHB EST-MCNC: 148 MG/DL
GFR SERPL CREATININE-BSD FRML MDRD: 82 ML/MIN/1.73SQ M
GLUCOSE SERPL-MCNC: 161 MG/DL (ref 65–140)
HBA1C MFR BLD: 6.8 % (ref 4.2–6.3)
HDLC SERPL-MCNC: 41 MG/DL (ref 40–60)
LDLC SERPL CALC-MCNC: 156 MG/DL (ref 0–100)
MICROALBUMIN UR-MCNC: 6.9 MG/L (ref 0–20)
MICROALBUMIN/CREAT 24H UR: 5 MG/G CREATININE (ref 0–30)
POTASSIUM SERPL-SCNC: 4.1 MMOL/L (ref 3.5–5.3)
PROT SERPL-MCNC: 8 G/DL (ref 6.4–8.2)
SODIUM SERPL-SCNC: 136 MMOL/L (ref 136–145)
TRIGL SERPL-MCNC: 126 MG/DL

## 2018-08-24 PROCEDURE — 82570 ASSAY OF URINE CREATININE: CPT | Performed by: INTERNAL MEDICINE

## 2018-08-24 PROCEDURE — 83036 HEMOGLOBIN GLYCOSYLATED A1C: CPT

## 2018-08-24 PROCEDURE — 80053 COMPREHEN METABOLIC PANEL: CPT

## 2018-08-24 PROCEDURE — 82043 UR ALBUMIN QUANTITATIVE: CPT | Performed by: INTERNAL MEDICINE

## 2018-08-24 PROCEDURE — 80061 LIPID PANEL: CPT

## 2018-08-24 PROCEDURE — 3061F NEG MICROALBUMINURIA REV: CPT | Performed by: INTERNAL MEDICINE

## 2018-08-24 PROCEDURE — 36415 COLL VENOUS BLD VENIPUNCTURE: CPT

## 2018-09-04 ENCOUNTER — OFFICE VISIT (OUTPATIENT)
Dept: INTERNAL MEDICINE CLINIC | Facility: CLINIC | Age: 58
End: 2018-09-04
Payer: COMMERCIAL

## 2018-09-04 VITALS
HEIGHT: 74 IN | OXYGEN SATURATION: 97 % | SYSTOLIC BLOOD PRESSURE: 140 MMHG | HEART RATE: 78 BPM | BODY MASS INDEX: 25.67 KG/M2 | DIASTOLIC BLOOD PRESSURE: 80 MMHG | TEMPERATURE: 98.4 F | RESPIRATION RATE: 18 BRPM | WEIGHT: 200 LBS

## 2018-09-04 DIAGNOSIS — E11.9 TYPE 2 DIABETES MELLITUS WITHOUT COMPLICATION, WITHOUT LONG-TERM CURRENT USE OF INSULIN (HCC): Primary | ICD-10-CM

## 2018-09-04 DIAGNOSIS — E78.2 MIXED HYPERLIPIDEMIA: ICD-10-CM

## 2018-09-04 DIAGNOSIS — R05.8 DRY COUGH: ICD-10-CM

## 2018-09-04 DIAGNOSIS — K21.9 GASTROESOPHAGEAL REFLUX DISEASE, ESOPHAGITIS PRESENCE NOT SPECIFIED: ICD-10-CM

## 2018-09-04 PROCEDURE — 99214 OFFICE O/P EST MOD 30 MIN: CPT | Performed by: INTERNAL MEDICINE

## 2018-09-04 PROCEDURE — 3008F BODY MASS INDEX DOCD: CPT | Performed by: INTERNAL MEDICINE

## 2018-09-04 RX ORDER — ASPIRIN 81 MG/1
81 TABLET ORAL DAILY
COMMUNITY

## 2018-09-04 RX ORDER — PANTOPRAZOLE SODIUM 20 MG/1
20 TABLET, DELAYED RELEASE ORAL DAILY
Qty: 30 TABLET | Refills: 1 | Status: SHIPPED | OUTPATIENT
Start: 2018-09-04 | End: 2019-10-30

## 2018-09-04 RX ORDER — AZELASTINE 1 MG/ML
1 SPRAY, METERED NASAL 2 TIMES DAILY
Qty: 30 ML | Refills: 1 | Status: SHIPPED | OUTPATIENT
Start: 2018-09-04 | End: 2018-10-01

## 2018-09-04 RX ORDER — ATORVASTATIN CALCIUM 10 MG/1
10 TABLET, FILM COATED ORAL DAILY
Qty: 30 TABLET | Refills: 3 | Status: SHIPPED | OUTPATIENT
Start: 2018-09-04 | End: 2018-11-02 | Stop reason: SDUPTHER

## 2018-09-04 NOTE — PATIENT INSTRUCTIONS
1  Start aspirin 81mg once a day(ADVISED NOT TO START YET DUE TO TA SYMPTOMS)  2  Recommend statin such as atorvastatin once a day  3  Recommend medication to protect kidney  4  Check blood sugars at home  5  Return in 1 month or sooner if questions  6  Take pantoprazole once a day for acid reflux for 2 months    Atorvastatin (By mouth)   Atorvastatin ([id number]-[id number]-in)  Treats high cholesterol and triglyceride levels  Reduces the risk of angina, stroke, heart attack, or certain heart and blood vessel problems  This medicine is a statin  Brand Name(s): Lipitor   There may be other brand names for this medicine  When This Medicine Should Not Be Used: This medicine is not right for everyone  Do not use it if you had an allergic reaction to atorvastatin, if you have active liver disease, or if you are pregnant or breastfeeding  How to Use This Medicine:   Tablet  · Take your medicine as directed  Your dose may need to be changed several times to find what works best for you  · Take this medicine at the same time each day  · Swallow the tablet whole  Do not break it  · Missed dose: Take a dose as soon as you remember  If it is less than 12 hours until your next dose, skip the missed dose and take the next dose at the regular time  Do not take 2 doses of this medicine within 12 hours  · Read and follow the patient instructions that come with this medicine  Talk to your doctor or pharmacist if you have any questions  · Store the medicine in a closed container at room temperature, away from heat, moisture, and direct light  Drugs and Foods to Avoid:   Ask your doctor or pharmacist before using any other medicine, including over-the-counter medicines, vitamins, and herbal products  · Some medicines can affect how atorvastatin works   Tell your doctor if you also use birth control pills, boceprevir, cimetidine, colchicine, cyclosporine, digoxin, niacin, rifampin, spironolactone, telaprevir, medicine to treat an infection, or medicine to treat HIV/AIDS  Warnings While Using This Medicine:   · It is not safe to take this medicine during pregnancy  It could harm an unborn baby  Tell your doctor right away if you become pregnant  · Tell your doctor if you have kidney disease, diabetes, muscle pain or weakness, thyroid problems, have recently had a stroke or TIA (transient ischemic attack), or have a history of liver disease  Tell your doctor if you drink grapefruit juice or drink alcohol regularly  · This medicine can cause muscle problems, which can lead to kidney problems  · Tell any doctor or dentist who treats you that you use this medicine  You may need to stop using it if you have surgery, have an injury, or develop serious health problems  · Your doctor will do lab tests at regular visits to check on the effects of this medicine  Keep all appointments  · Keep all medicine out of the reach of children  Never share your medicine with anyone  Possible Side Effects While Using This Medicine:   Call your doctor right away if you notice any of these side effects:  · Allergic reaction: Itching or hives, swelling in your face or hands, swelling or tingling in your mouth or throat, chest tightness, trouble breathing  · Blistering, peeling, red skin rash  · Change in how much or how often you urinate  · Dark urine or pale stools, nausea, vomiting, loss of appetite, stomach pain, yellow skin or eyes  · Fever  · Muscle pain, tenderness, or weakness  · Unusual tiredness  If you notice these less serious side effects, talk with your doctor:   · Diarrhea  · Joint pain  If you notice other side effects that you think are caused by this medicine, tell your doctor  Call your doctor for medical advice about side effects  You may report side effects to FDA at 3-529-FDA-8999  © 2017 [address] [employer] Information is for End User's use only and may not be sold, redistributed or otherwise used for commercial purposes    The above information is an  only  It is not intended as medical advice for individual conditions or treatments  Talk to your doctor, nurse or pharmacist before following any medical regimen to see if it is safe and effective for you

## 2018-09-04 NOTE — PROGRESS NOTES
Assessment/Plan:     Diagnoses and all orders for this visit:    Type 2 diabetes mellitus without complication, without long-term current use of insulin ([facility] [address] )  Comments:  not checking blood sugars, advised to check at home  discussed starting statin and ACEI, he is willing to start statin and no ACEI at this time   Orders:  -     atorvastatin (LIPITOR) 10 mg tablet; Take 1 tablet (10 mg total) by mouth daily    Mixed hyperlipidemia  Comments:  willing to start statin, d/w pt SE of med and ordered  Orders:  -     atorvastatin (LIPITOR) 10 mg tablet; Take 1 tablet (10 mg total) by mouth daily    Dry cough  Comments:  ongoing for 1 year, advised to start PPI for 1-2 mos trial & astelin NS   if not better or symptoms recur -> GI eval for EGD  Orders:  -     azelastine (ASTELIN) 0 1 % nasal spray; 1 spray into each nostril 2 (two) times a day Use in each nostril as directed  -     pantoprazole (PROTONIX) 20 mg tablet; Take 1 tablet (20 mg total) by mouth daily    Gastroesophageal reflux disease, esophagitis presence not specified  Comments:  ongoing, intermittent and with dry cough, diet modification discussed & plan as above  Orders:  -     pantoprazole (PROTONIX) 20 mg tablet; Take 1 tablet (20 mg total) by mouth daily    Other orders  -     aspirin (ECOTRIN LOW STRENGTH) 81 mg EC tablet; Take 81 mg by mouth daily         Subjective:      Patient ID: Corey Layne is a 66 y o  male  HPI    Here for follow up, reviewed meds & most recent BW with patient  Not checking his blood sugars and overall feels well  Rash on feet has improved with treatment plan  Would rather not take medications at all and declines to take statin or ACE inhibitor  Risks and benefits of medications discussed with Saray Martínez today including intention to help prevent heart attack and/or stroke in future  No SE from [facility] in past     Having dry cough off/on for last 1 year    Believes sx are from TA, as also having some reflux sx intermittently especially after certain foods  No abd pain, dysphagia or mucus production with cough  No sore throat or change in voice, hx of asthma or COPD  Takes TUMS with temporary relief  Needs FMLA form completed(hasn't brought to office yet) to take intermittent leave to take care of his wife who is having some medical problems  No other complaints  History reviewed  No pertinent past medical history  Vitals:    09/04/18 1348 09/04/18 1430   BP: 144/90 140/80   Pulse: 96 78   Resp: 18    Temp: 98 4 °F (36 9 °C)    TempSrc: Oral    SpO2: 97%    Weight: 90 7 kg (200 lb)    Height: 6' 2" (1 88 m)      Body mass index is 25 68 kg/m²  Current Outpatient Prescriptions:     aspirin (ECOTRIN LOW STRENGTH) 81 mg EC tablet, Take 81 mg by mouth daily, Disp: , Rfl:     calcium carbonate (OS-EDIE) 1250 (500 Ca) MG chewable tablet, Chew 2 tablets 2 (two) times a day as needed  , Disp: , Rfl:     Flaxseed, Linseed, (FLAXSEED OIL) 1000 MG CAPS, Take by mouth, Disp: , Rfl:     metFORMIN (GLUCOPHAGE) 1000 MG tablet, Take 1,000 mg by mouth 2 (two) times a day  , Disp: , Rfl:     mineral oil-hydrophilic petrolatum (AQUAPHOR) ointment, Apply topically 4 (four) times a day, Disp: 420 g, Rfl: 0    tolnaftate (TINACTIN) 1 % cream, Apply topically, Disp: , Rfl:     atorvastatin (LIPITOR) 10 mg tablet, Take 1 tablet (10 mg total) by mouth daily, Disp: 30 tablet, Rfl: 3    azelastine (ASTELIN) 0 1 % nasal spray, 1 spray into each nostril 2 (two) times a day Use in each nostril as directed, Disp: 30 mL, Rfl: 1    pantoprazole (PROTONIX) 20 mg tablet, Take 1 tablet (20 mg total) by mouth daily, Disp: 30 tablet, Rfl: 1  Allergies   Allergen Reactions    Delsym [Dextromethorphan] Syncope         Review of Systems   Constitutional: Negative for fever  HENT: Negative for congestion  Eyes: Negative for visual disturbance  Respiratory: Negative for shortness of breath  Cardiovascular: Negative for chest pain     Genitourinary: Negative for difficulty urinating  Musculoskeletal: Negative for arthralgias  Skin: Positive for rash (but improved)  Neurological: Negative for headaches  Psychiatric/Behavioral: Negative for dysphoric mood  Objective:      /80   Pulse 78   Temp 98 4 °F (36 9 °C) (Oral)   Resp 18   Ht 6' 2" (1 88 m)   Wt 90 7 kg (200 lb)   SpO2 97%   BMI 25 68 kg/m²          Physical Exam   Constitutional: He appears well-developed and well-nourished  HENT:   Head: Normocephalic and atraumatic  Mouth/Throat: Oropharynx is clear and moist  No oropharyngeal exudate  Eyes: Conjunctivae are normal  Pupils are equal, round, and reactive to light  Cardiovascular: Normal rate, regular rhythm and normal heart sounds  No murmur heard  Pulmonary/Chest: Effort normal and breath sounds normal  He has no wheezes  He has no rales  Abdominal: Soft  Bowel sounds are normal  There is no tenderness  Musculoskeletal: He exhibits no edema  Neurological: He is alert  Psychiatric: He has a normal mood and affect  His behavior is normal    Vitals reviewed        Results for orders placed or performed in visit on 08/24/18   Comprehensive metabolic panel   Result Value Ref Range    Sodium 136 136 - 145 mmol/L    Potassium 4 1 3 5 - 5 3 mmol/L    Chloride 101 100 - 108 mmol/L    CO2 28 21 - 32 mmol/L    ANION GAP 7 4 - 13 mmol/L    BUN 15 5 - 25 mg/dL    Creatinine 1 02 0 60 - 1 30 mg/dL    Glucose 161 (H) 65 - 140 mg/dL    Calcium 9 9 8 3 - 10 1 mg/dL    AST 27 5 - 45 U/L    ALT 49 12 - 78 U/L    Alkaline Phosphatase 80 46 - 116 U/L    Total Protein 8 0 6 4 - 8 2 g/dL    Albumin 4 0 3 5 - 5 0 g/dL    Total Bilirubin 0 40 0 20 - 1 00 mg/dL    eGFR 82 ml/min/1 73sq m   Hemoglobin A1C   Result Value Ref Range    Hemoglobin A1C 6 8 (H) 4 2 - 6 3 %     mg/dl   Lipid Panel with Direct LDL reflex   Result Value Ref Range    Cholesterol 222 (H) 50 - 200 mg/dL    Triglycerides 126 <=150 mg/dL    HDL, Direct 41 40 - 60 mg/dL    LDL Calculated 156 (H) 0 - 100 mg/dL

## 2018-10-01 ENCOUNTER — OFFICE VISIT (OUTPATIENT)
Dept: INTERNAL MEDICINE CLINIC | Facility: CLINIC | Age: 58
End: 2018-10-01
Payer: COMMERCIAL

## 2018-10-01 VITALS
OXYGEN SATURATION: 97 % | BODY MASS INDEX: 25.85 KG/M2 | TEMPERATURE: 98.7 F | HEART RATE: 104 BPM | RESPIRATION RATE: 18 BRPM | HEIGHT: 74 IN | WEIGHT: 201.4 LBS | DIASTOLIC BLOOD PRESSURE: 84 MMHG | SYSTOLIC BLOOD PRESSURE: 122 MMHG

## 2018-10-01 DIAGNOSIS — Z12.11 SCREEN FOR COLON CANCER: ICD-10-CM

## 2018-10-01 DIAGNOSIS — E78.2 MIXED HYPERLIPIDEMIA: ICD-10-CM

## 2018-10-01 DIAGNOSIS — K21.9 GASTROESOPHAGEAL REFLUX DISEASE, ESOPHAGITIS PRESENCE NOT SPECIFIED: ICD-10-CM

## 2018-10-01 DIAGNOSIS — E11.9 TYPE 2 DIABETES MELLITUS WITHOUT COMPLICATION, WITHOUT LONG-TERM CURRENT USE OF INSULIN (HCC): Primary | ICD-10-CM

## 2018-10-01 DIAGNOSIS — Z23 NEED FOR TDAP VACCINATION: ICD-10-CM

## 2018-10-01 PROBLEM — R05.8 DRY COUGH: Status: ACTIVE | Noted: 2018-10-01

## 2018-10-01 PROBLEM — R05.8 DRY COUGH: Status: RESOLVED | Noted: 2018-10-01 | Resolved: 2018-10-01

## 2018-10-01 PROCEDURE — 99214 OFFICE O/P EST MOD 30 MIN: CPT | Performed by: INTERNAL MEDICINE

## 2018-10-01 PROCEDURE — 90715 TDAP VACCINE 7 YRS/> IM: CPT

## 2018-10-01 PROCEDURE — 1036F TOBACCO NON-USER: CPT | Performed by: INTERNAL MEDICINE

## 2018-10-01 PROCEDURE — 90471 IMMUNIZATION ADMIN: CPT

## 2018-10-01 NOTE — PATIENT INSTRUCTIONS
1  Recommend to complete colonoscopy  2  Blood work in January  3  After taking pantoprazole for 2 months, taper down dose over last 10 days(every other day, every third day, then stop)  4  If acid reflux or dry cough returns -> will need to see Dr Juan Yuan for upper endoscopy  5   Return in January after blood work

## 2018-10-01 NOTE — PROGRESS NOTES
Assessment/Plan:     Diagnoses and all orders for this visit:    Type 2 diabetes mellitus without complication, without long-term current use of insulin ([facility] [address] )  Comments:  continue checking BS at home periodically  c/w metformin and BW In 3 mos  Orders:  -     Hemoglobin A1C; Future  -     Hepatic function panel; Future  -     Basic metabolic panel; Future    Mixed hyperlipidemia  Comments:  taking statin and no SE, re-check FLP In 2-3 mos  Orders:  -     Hepatic function panel; Future  -     Lipid Panel with Direct LDL reflex; Future    Need for Tdap vaccination  Comments:  unsure when had but 20+ years at least per pt, re-vaccinate now  Orders:  -     Tdap vaccine greater than or equal to 8yo IM    Screen for colon cancer  Comments:  emphasized importance of keeping f/u with GI for colonoscopy, he verbalized understanding and will schedule but not right now    Gastroesophageal reflux disease, esophagitis presence not specified  Comments:  symptoms manifested as dry cough but improved with PPI  take for 2 mos and taper down dose over last 10 days  if symptoms return, will need EGD(d/w pt)      declines flu vaccine  Advised he will need increase in statin dose if repeat FLP not at goal   Okay to stop flaxseed supplement  Pt not taking astelin & doing well, rx canceled  Spent >25 mins with patient including >50% of time counseling on TA, tapering PPI and importance of f/u with GI for colonoscopy and potentially EGD & for coordination of care      Subjective:      Patient ID: Ishmael Casarez is a 66 y o  male  HPI    Here for follow up  Reviewed meds with patient  Due for colonoscopy in 2017 not ready to complete this yet  Checking BS at home but did not bring in readings today, reports they are controlled (all <130 in morning)  Thought his appt was at 11 am today  Declines flu vaccine as he felt SOB after taking it last   No hx of egg allergy    Taking PPI and cough improved, denies any other concerns or complaints  History reviewed  No pertinent past medical history  Vitals:    10/01/18 1028   BP: 122/84   Pulse: 104   Resp: 18   Temp: 98 7 °F (37 1 °C)   TempSrc: Oral   SpO2: 97%   Weight: 91 4 kg (201 lb 6 4 oz)   Height: 6' 2" (1 88 m)     Body mass index is 25 86 kg/m²  Current Outpatient Prescriptions:     aspirin (ECOTRIN LOW STRENGTH) 81 mg EC tablet, Take 81 mg by mouth daily, Disp: , Rfl:     atorvastatin (LIPITOR) 10 mg tablet, Take 1 tablet (10 mg total) by mouth daily, Disp: 30 tablet, Rfl: 3    calcium carbonate (OS-EDIE) 1250 (500 Ca) MG chewable tablet, Chew 2 tablets 2 (two) times a day as needed  , Disp: , Rfl:     metFORMIN (GLUCOPHAGE) 1000 MG tablet, Take 1,000 mg by mouth 2 (two) times a day  , Disp: , Rfl:     mineral oil-hydrophilic petrolatum (AQUAPHOR) ointment, Apply topically 4 (four) times a day, Disp: 420 g, Rfl: 0    pantoprazole (PROTONIX) 20 mg tablet, Take 1 tablet (20 mg total) by mouth daily, Disp: 30 tablet, Rfl: 1    tolnaftate (TINACTIN) 1 % cream, Apply topically, Disp: , Rfl:   Allergies   Allergen Reactions    Delsym [Dextromethorphan] Syncope         Review of Systems   Constitutional: Negative for fever  HENT: Negative for congestion  Eyes: Negative for visual disturbance  Respiratory: Negative for cough  Cardiovascular: Negative for chest pain  Gastrointestinal: Negative for abdominal pain  Genitourinary: Negative for dysuria  Musculoskeletal: Negative for arthralgias  Skin: Negative for rash  Allergic/Immunologic: Negative for food allergies  Neurological: Negative for headaches  Psychiatric/Behavioral: Negative for dysphoric mood  Objective:      /84   Pulse 104   Temp 98 7 °F (37 1 °C) (Oral)   Resp 18   Ht 6' 2" (1 88 m)   Wt 91 4 kg (201 lb 6 4 oz)   SpO2 97%   BMI 25 86 kg/m²          Physical Exam   Constitutional: He appears well-developed and well-nourished     HENT:   Head: Normocephalic and atraumatic  Mouth/Throat: Oropharynx is clear and moist    Eyes: Pupils are equal, round, and reactive to light  Conjunctivae are normal    Cardiovascular: Normal rate, regular rhythm and normal heart sounds  No murmur heard  Pulmonary/Chest: Effort normal and breath sounds normal  He has no wheezes  He has no rales  Abdominal: Soft  Bowel sounds are normal  There is no tenderness  Musculoskeletal: He exhibits no edema  Neurological: He is alert  Psychiatric: He has a normal mood and affect  His behavior is normal    Vitals reviewed        Results for orders placed or performed in visit on 08/24/18   Comprehensive metabolic panel   Result Value Ref Range    Sodium 136 136 - 145 mmol/L    Potassium 4 1 3 5 - 5 3 mmol/L    Chloride 101 100 - 108 mmol/L    CO2 28 21 - 32 mmol/L    ANION GAP 7 4 - 13 mmol/L    BUN 15 5 - 25 mg/dL    Creatinine 1 02 0 60 - 1 30 mg/dL    Glucose 161 (H) 65 - 140 mg/dL    Calcium 9 9 8 3 - 10 1 mg/dL    AST 27 5 - 45 U/L    ALT 49 12 - 78 U/L    Alkaline Phosphatase 80 46 - 116 U/L    Total Protein 8 0 6 4 - 8 2 g/dL    Albumin 4 0 3 5 - 5 0 g/dL    Total Bilirubin 0 40 0 20 - 1 00 mg/dL    eGFR 82 ml/min/1 73sq m   Hemoglobin A1C   Result Value Ref Range    Hemoglobin A1C 6 8 (H) 4 2 - 6 3 %     mg/dl   Lipid Panel with Direct LDL reflex   Result Value Ref Range    Cholesterol 222 (H) 50 - 200 mg/dL    Triglycerides 126 <=150 mg/dL    HDL, Direct 41 40 - 60 mg/dL    LDL Calculated 156 (H) 0 - 100 mg/dL

## 2018-11-01 DIAGNOSIS — E11.9 TYPE 2 DIABETES MELLITUS WITHOUT COMPLICATION, WITHOUT LONG-TERM CURRENT USE OF INSULIN (HCC): Primary | ICD-10-CM

## 2018-11-02 DIAGNOSIS — E78.2 MIXED HYPERLIPIDEMIA: ICD-10-CM

## 2018-11-02 DIAGNOSIS — E11.9 TYPE 2 DIABETES MELLITUS WITHOUT COMPLICATION, WITHOUT LONG-TERM CURRENT USE OF INSULIN (HCC): ICD-10-CM

## 2018-11-02 RX ORDER — ATORVASTATIN CALCIUM 10 MG/1
10 TABLET, FILM COATED ORAL DAILY
Qty: 90 TABLET | Refills: 1 | Status: SHIPPED | OUTPATIENT
Start: 2018-11-02 | End: 2019-10-30

## 2018-11-02 NOTE — TELEPHONE ENCOUNTER
Pt called insurance wont pay for a 30 day supply must be 90 day supply of Atorvastatin 10 mg    Pharmacy confirmed

## 2018-11-15 DIAGNOSIS — E11.9 TYPE 2 DIABETES MELLITUS WITHOUT COMPLICATION, WITHOUT LONG-TERM CURRENT USE OF INSULIN (HCC): ICD-10-CM

## 2018-11-15 DIAGNOSIS — E78.2 MIXED HYPERLIPIDEMIA: ICD-10-CM

## 2018-11-15 RX ORDER — ATORVASTATIN CALCIUM 10 MG/1
10 TABLET, FILM COATED ORAL DAILY
Qty: 90 TABLET | Refills: 0 | Status: CANCELLED | OUTPATIENT
Start: 2018-11-15

## 2018-11-15 NOTE — TELEPHONE ENCOUNTER
PATIENT CAME IN STATES HE IS BREAKIN GOUT ON HIS LEGS, ARMS AND BACK SINCE HE STARTED THE ATORVASTATIN    HE TAKES ONE PILL A DAY AROUND 5PM, NO OTHER SYMPTOMS ASSOCIATED WITH THE BREAKOUT SUCH AS SHORTNESS OF BREATH R ANYTHING     SCHEDULED HIM AM APPOINTMENT ON Monday BECAUSE HE COULD NOT MAKE THE ONE OFFERED FOR TODAY  PATIENT AND HIS WIFE ALSO STATED THAT THEY HAVE BEEN PUTTING CORTIZONE CREAM ON IT AND IT SEEMS TO BE HELPING

## 2019-01-19 ENCOUNTER — HOSPITAL ENCOUNTER (EMERGENCY)
Facility: HOSPITAL | Age: 59
Discharge: HOME | End: 2019-01-19
Attending: EMERGENCY MEDICINE
Payer: COMMERCIAL

## 2019-01-19 VITALS
HEART RATE: 84 BPM | OXYGEN SATURATION: 98 % | SYSTOLIC BLOOD PRESSURE: 143 MMHG | WEIGHT: 204.15 LBS | BODY MASS INDEX: 26.21 KG/M2 | DIASTOLIC BLOOD PRESSURE: 87 MMHG | TEMPERATURE: 98.9 F | RESPIRATION RATE: 20 BRPM

## 2019-01-19 DIAGNOSIS — R21 RASH: Primary | ICD-10-CM

## 2019-01-19 PROCEDURE — 99282 EMERGENCY DEPT VISIT SF MDM: CPT

## 2019-01-19 RX ORDER — CLOTRIMAZOLE AND BETAMETHASONE DIPROPIONATE 10; .64 MG/G; MG/G
CREAM TOPICAL
Qty: 15 G | Refills: 0 | Status: SHIPPED | OUTPATIENT
Start: 2019-01-19 | End: 2020-01-10

## 2019-01-19 NOTE — DISCHARGE INSTRUCTIONS
Acute Rash   WHAT YOU NEED TO KNOW:   A rash is irritation, redness, or itchiness in the skin or mucus membranes  Mucus membranes are areas such as the lining of your nose or throat  Acute means the rash starts suddenly, worsens quickly, and lasts a short time  An acute rash may be caused by a disease, such as hepatitis or vasculitis  The rash may be a reaction to something you are allergic to, such as certain foods, or latex  Certain medicines, including antibiotics, NSAIDs, prescription pain medicine, and aspirin can also cause a rash  DISCHARGE INSTRUCTIONS:   Return to the emergency department if:   · You have sudden trouble breathing or chest pain  · You are vomiting, have a headache or muscle aches, and your throat hurts  Contact your healthcare provider if:   · You have a fever  · You get open wounds from scratching your skin, or you have a wound that is red, swollen, or painful  · Your rash lasts longer than 3 months  · You have swelling or pain in your joints  · You have questions or concerns about your condition or care  Medicines:  If your rash does not go away on its own, you may need the following medicines:  · Antihistamines  may be given to help decrease itching  · Steroids  may be given to decrease inflammation  · Antibiotics  help fight or prevent a bacterial infection  · Take your medicine as directed  Contact your healthcare provider if you think your medicine is not helping or if you have side effects  Tell him of her if you are allergic to any medicine  Keep a list of the medicines, vitamins, and herbs you take  Include the amounts, and when and why you take them  Bring the list or the pill bottles to follow-up visits  Carry your medicine list with you in case of an emergency  Prevent a rash or care for your skin when you have a rash:  Dry skin can lead to more problems  Do not scratch your skin if it itches  You may cause a skin infection by scratching   The following may prevent dry skin, and help your skin look better:  · Use thick cream lotions or petroleum jelly to help soothe your rash  These products work well on areas with thick skin, such as your feet  Cool compresses may also be used to soothe your skin  Apply a cool compress or a cool, wet towel, and then cover it with a dry towel  · Use lukewarm water when you bathe  Hot water may damage your skin more  Pat your skin dry  Do not rub your skin with a towel  · Use detergents, soaps, shampoos, and bubble baths made for sensitive skin  Wear clothes that are made of cotton instead of nylon or wool  Cotton is softer, so it will not hurt your skin as much  Follow up with your healthcare provider as directed: You may need to see a dermatologist if healthcare providers do not know what is causing your rash  You may also need to see a dermatologist if your rash does not get better even with treatment  You may need to see a dietitian if you have allergies to foods  Write down your questions so you remember to ask them during your visits  © 2017 [address] [facility] Information is for End User's use only and may not be sold, redistributed or otherwise used for commercial purposes  All illustrations and images included in CareNotes® are the copyrighted property of [employer]  or [employer]  The above information is an  only  It is not intended as medical advice for individual conditions or treatments  Talk to your doctor, nurse or pharmacist before following any medical regimen to see if it is safe and effective for you

## 2019-01-19 NOTE — ED PROVIDER NOTES
History  Chief Complaint   Patient presents with    Rash     Pt  c/o generalized rash for one month  Pt  reports starting atorvastatin around the same time  Meg Gorman is a 66 y o  male who presents to the ED with complaints of generalized rash x 1 month  Patient states upon starting atorvastatin he started having a rash to his generalized body which patient describes as pruritic, raised, red, circular lesions  Patient states he informed PCP of this rash was told to discontinue atorvastatin  Patient has been using over-the-counter Benadryl and hydrocortisone cream with some relief  Patient states most of the rash have subsided but he still has large lesion to the right lower extremity  Denies pain, discharge, exposure similar rash, scalp irritation, known insect bite, fever, chills, chest pain, shortness breath  History provided by:  Patient  Rash   Location:  Shoulder/arm, torso and leg  Shoulder/arm rash location:  L arm and R arm  Torso rash location:  Upper back  Leg rash location:  L leg and R leg  Quality: dryness, itchiness, redness and scaling    Duration:  1 month  Timing:  Intermittent  Progression:  Spreading  Context: not animal contact, not eggs, not exposure to similar rash, not insect bite/sting, not medications, not new detergent/soap and not sick contacts    Relieved by: Topical steroids  Associated symptoms: no abdominal pain, no diarrhea, no fatigue, no fever, no headaches, no hoarse voice, no induration, no joint pain, no myalgias, no nausea, no periorbital edema, no shortness of breath, no sore throat, no throat swelling, no tongue swelling, no URI, not vomiting and not wheezing        Prior to Admission Medications   Prescriptions Last Dose Informant Patient Reported?  Taking?   aspirin (ECOTRIN LOW STRENGTH) 81 mg EC tablet  Self Yes No   Sig: Take 81 mg by mouth daily   atorvastatin (LIPITOR) 10 mg tablet   No No   Sig: Take 1 tablet (10 mg total) by mouth daily calcium carbonate (OS-EDIE) 1250 (500 Ca) MG chewable tablet  Self Yes No   Sig: Chew 2 tablets 2 (two) times a day as needed     metFORMIN (GLUCOPHAGE) 1000 MG tablet   No No   Sig: Take 1 tablet (1,000 mg total) by mouth 2 (two) times a day   mineral oil-hydrophilic petrolatum (AQUAPHOR) ointment  Self No No   Sig: Apply topically 4 (four) times a day   pantoprazole (PROTONIX) 20 mg tablet  Self No No   Sig: Take 1 tablet (20 mg total) by mouth daily   tolnaftate (TINACTIN) 1 % cream  Self Yes No   Sig: Apply topically      Facility-Administered Medications: None       Past Medical History:   Diagnosis Date    Diabetes mellitus ([facility] [address] )        Past Surgical History:   Procedure Laterality Date    FRACTURE SURGERY         Family History   Problem Relation Age of Onset    Diabetes Mother     Diabetes Sister     Diabetes Brother     Pancreatic cancer Sister     Aneurysm Sister         vertebral artery aneurysm     I have reviewed and agree with the history as documented  Social History   Substance Use Topics    Smoking status: Former Smoker     Types: Cigarettes    Smokeless tobacco: Never Used    Alcohol use No      Comment: 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)        Review of Systems   Constitutional: Negative for appetite change, chills, fatigue, fever and unexpected weight change  HENT: Negative for congestion, drooling, ear pain, hoarse voice, rhinorrhea, sore throat, trouble swallowing and voice change  Eyes: Negative for pain, discharge, redness and visual disturbance  Respiratory: Negative for cough, shortness of breath, wheezing and stridor  Cardiovascular: Negative for chest pain, palpitations and leg swelling  Gastrointestinal: Negative for abdominal pain, blood in stool, constipation, diarrhea, nausea and vomiting  Genitourinary: Negative for dysuria, flank pain, frequency, hematuria and urgency     Musculoskeletal: Negative for arthralgias, gait problem, joint swelling, myalgias, neck pain and neck stiffness  Skin: Positive for rash  Negative for color change, pallor and wound  Neurological: Negative for dizziness, seizures, light-headedness and headaches  Physical Exam  Physical Exam   Constitutional: He is oriented to person, place, and time  Vital signs are normal  He appears well-developed and well-nourished  He is cooperative  Non-toxic appearance  No distress  HENT:   Head: Normocephalic and atraumatic  Nose: Nose normal    Mouth/Throat: Oropharynx is clear and moist    Eyes: Pupils are equal, round, and reactive to light  Conjunctivae, EOM and lids are normal    Neck: Trachea normal, normal range of motion, full passive range of motion without pain and phonation normal  Neck supple  Cardiovascular: Normal rate, regular rhythm and intact distal pulses  Pulmonary/Chest: Effort normal and breath sounds normal    Abdominal: Soft  Bowel sounds are normal    Musculoskeletal: Normal range of motion  Neurological: He is alert and oriented to person, place, and time  He has normal strength  GCS eye subscore is 4  GCS verbal subscore is 5  GCS motor subscore is 6  Skin: Skin is warm and dry  Capillary refill takes less than 2 seconds  Rash noted  Raised, circular, erythematous rash with central scaling a no central clearing, no discharge, no tenderness to palpation  Patient is scratching at rash during examination  Rashes notice most intensely on the anterior lateral aspect of the right lower leg  Small lesions are noted to the anterior aspect of the arms and upper back  Psychiatric: He has a normal mood and affect  Nursing note and vitals reviewed        Vital Signs  ED Triage Vitals [01/19/19 1332]   Temperature Pulse Respirations Blood Pressure SpO2   98 9 °F (37 2 °C) 84 20 143/87 98 %      Temp Source Heart Rate Source Patient Position - Orthostatic VS BP Location FiO2 (%)   Oral -- -- -- --      Pain Score       4           Vitals: 01/19/19 1332 01/19/19 1345   BP: 143/87 143/87   Pulse: 84        Visual Acuity      ED Medications  Medications - No data to display    Diagnostic Studies  Results Reviewed     None                 No orders to display              Procedures  Procedures       Phone Contacts  ED Phone Contact    ED Course  ED Course as of Jan 19 1856   Sat Jan 19, 2019   1430 Educated patient regarding diagnosis and management  Advised patient to follow up with PCP  Advised patient to RTER for persistent or worsening symptoms  MDM  Number of Diagnoses or Management Options  Rash: new and does not require workup  Diagnosis management comments: Maria Del Carmen Alfaro is a 66 y o  male who presents to the ED with complaints of generalized rash x 1 month  Patient states upon starting atorvastatin he started having a rash to his generalized body which patient describes as pruritic, raised, red, circular lesions  Patient states he informed PCP of this rash was told to discontinue atorvastatin  Patient has been using over-the-counter Benadryl and hydrocortisone cream with some relief  Patient states most of the rash have subsided but he still has large lesion to the right lower extremity  Denies pain, discharge, exposure similar rash, scalp irritation, known insect bite, fever, chills, chest pain, shortness breath  On physical examination, there are raised, circular, erythematous rash with central scaling a no central clearing, no discharge, no tenderness to palpation  Patient is scratching at rash during examination  Rashes notice most intensely on the anterior lateral aspect of the right lower leg  Small lesions are noted to the anterior aspect of the arms and upper back  I provided patient with strict return to ER precautions  Patient verbalized understanding  Advised the patient follow up with PCP in 24-48 hours      Patient Progress  Patient progress: improved    CritCare Time    Disposition  Final diagnoses:   Rash     Time reflects when diagnosis was documented in both MDM as applicable and the Disposition within this note     Time User Action Codes Description Comment    1/19/2019  2:36 PM Rodney, [id number] Berg Rash       ED Disposition     ED Disposition Condition Comment    Discharge  Kian A Quick discharge to home/self care      Condition at discharge: Good        Follow-up Information     Follow up With Specialties Details Why Contact Info Additional [address] [facility] Emergency Department Emergency Medicine Go to If symptoms worsen [address] [facility]  AN ED, [address], [place], [place], [address], [address] Internal Medicine Schedule an appointment as soon as possible for a visit  [address]  [address] [place] [address]  [phone]             Discharge Medication List as of 1/19/2019  2:37 PM      START taking these medications    Details   clotrimazole-betamethasone (LOTRISONE) 1-0 05 % cream Apply to affected area 2 times daily, Print         CONTINUE these medications which have NOT CHANGED    Details   aspirin (ECOTRIN LOW STRENGTH) 81 mg EC tablet Take 81 mg by mouth daily, Historical Med      atorvastatin (LIPITOR) 10 mg tablet Take 1 tablet (10 mg total) by mouth daily, Starting Fri 11/2/2018, Normal      calcium carbonate (OS-EDIE) 1250 (500 Ca) MG chewable tablet Chew 2 tablets 2 (two) times a day as needed  , Historical Med      metFORMIN (GLUCOPHAGE) 1000 MG tablet Take 1 tablet (1,000 mg total) by mouth 2 (two) times a day, Starting Thu 11/15/2018, Normal      mineral oil-hydrophilic petrolatum (AQUAPHOR) ointment Apply topically 4 (four) times a day, Starting Thu 8/2/2018, Normal      pantoprazole (PROTONIX) 20 mg tablet Take 1 tablet (20 mg total) by mouth daily, Starting Tue 9/4/2018, Normal      tolnaftate (TINACTIN) 1 % cream Apply topically, Historical Med           No discharge procedures on file      ED Provider  Electronically Signed by           Darian Brewer PA-C  01/19/19 7811

## 2019-04-06 ENCOUNTER — HOSPITAL ENCOUNTER (EMERGENCY)
Facility: HOSPITAL | Age: 59
Discharge: HOME | End: 2019-04-06
Attending: EMERGENCY MEDICINE | Admitting: EMERGENCY MEDICINE
Payer: COMMERCIAL

## 2019-04-06 ENCOUNTER — APPOINTMENT (EMERGENCY)
Dept: RADIOLOGY | Facility: HOSPITAL | Age: 59
End: 2019-04-06
Payer: COMMERCIAL

## 2019-04-06 VITALS
DIASTOLIC BLOOD PRESSURE: 75 MMHG | TEMPERATURE: 98.2 F | RESPIRATION RATE: 19 BRPM | OXYGEN SATURATION: 98 % | SYSTOLIC BLOOD PRESSURE: 150 MMHG | HEART RATE: 85 BPM

## 2019-04-06 DIAGNOSIS — L03.116 CELLULITIS OF LEFT FOOT: Primary | ICD-10-CM

## 2019-04-06 DIAGNOSIS — R21 RASH AND NONSPECIFIC SKIN ERUPTION: ICD-10-CM

## 2019-04-06 PROCEDURE — 99283 EMERGENCY DEPT VISIT LOW MDM: CPT | Performed by: EMERGENCY MEDICINE

## 2019-04-06 PROCEDURE — 73630 X-RAY EXAM OF FOOT: CPT

## 2019-04-06 PROCEDURE — 99283 EMERGENCY DEPT VISIT LOW MDM: CPT

## 2019-04-06 RX ORDER — TRAMADOL HYDROCHLORIDE 50 MG/1
50 TABLET ORAL EVERY 6 HOURS PRN
Qty: 15 TABLET | Refills: 0 | Status: SHIPPED | OUTPATIENT
Start: 2019-04-06 | End: 2019-04-16

## 2019-04-06 RX ORDER — CEPHALEXIN 250 MG/1
500 CAPSULE ORAL ONCE
Status: COMPLETED | OUTPATIENT
Start: 2019-04-06 | End: 2019-04-06

## 2019-04-06 RX ORDER — CEPHALEXIN 500 MG/1
500 CAPSULE ORAL 4 TIMES DAILY
Qty: 28 CAPSULE | Refills: 0 | Status: SHIPPED | OUTPATIENT
Start: 2019-04-06 | End: 2019-04-13

## 2019-04-06 RX ADMIN — CEPHALEXIN 500 MG: 250 CAPSULE ORAL at 11:22

## 2019-04-11 DIAGNOSIS — E11.9 TYPE 2 DIABETES MELLITUS WITHOUT COMPLICATION, WITHOUT LONG-TERM CURRENT USE OF INSULIN (HCC): ICD-10-CM

## 2019-10-24 DIAGNOSIS — E11.9 TYPE 2 DIABETES MELLITUS WITHOUT COMPLICATION, WITHOUT LONG-TERM CURRENT USE OF INSULIN (HCC): Primary | ICD-10-CM

## 2019-10-30 ENCOUNTER — OFFICE VISIT (OUTPATIENT)
Dept: FAMILY MEDICINE CLINIC | Facility: CLINIC | Age: 59
End: 2019-10-30
Payer: COMMERCIAL

## 2019-10-30 VITALS
OXYGEN SATURATION: 98 % | HEART RATE: 69 BPM | WEIGHT: 196 LBS | DIASTOLIC BLOOD PRESSURE: 72 MMHG | SYSTOLIC BLOOD PRESSURE: 120 MMHG | HEIGHT: 74 IN | BODY MASS INDEX: 25.15 KG/M2 | RESPIRATION RATE: 16 BRPM

## 2019-10-30 DIAGNOSIS — E78.2 MIXED HYPERLIPIDEMIA: ICD-10-CM

## 2019-10-30 DIAGNOSIS — E11.9 TYPE 2 DIABETES MELLITUS WITHOUT COMPLICATION, WITHOUT LONG-TERM CURRENT USE OF INSULIN (HCC): Primary | ICD-10-CM

## 2019-10-30 DIAGNOSIS — Z11.59 NEED FOR HEPATITIS C SCREENING TEST: ICD-10-CM

## 2019-10-30 PROBLEM — K21.9 GASTROESOPHAGEAL REFLUX: Status: RESOLVED | Noted: 2018-10-01 | Resolved: 2019-10-30

## 2019-10-30 LAB — SL AMB POCT HEMOGLOBIN AIC: 8.1 (ref ?–6.5)

## 2019-10-30 PROCEDURE — 83036 HEMOGLOBIN GLYCOSYLATED A1C: CPT | Performed by: FAMILY MEDICINE

## 2019-10-30 PROCEDURE — 3008F BODY MASS INDEX DOCD: CPT | Performed by: FAMILY MEDICINE

## 2019-10-30 PROCEDURE — 99204 OFFICE O/P NEW MOD 45 MIN: CPT | Performed by: FAMILY MEDICINE

## 2019-10-30 NOTE — ASSESSMENT & PLAN NOTE
He had hives with the atorvastatin so he is reluctant to start any new medication, advised to have labs and then follow

## 2019-10-30 NOTE — ASSESSMENT & PLAN NOTE
Lab Results   Component Value Date    HGBA1C 8 1 (A) 10/30/2019    He was out of medication for 1 month, he was only taking 1000 mg metformin rather than twice a day, and currently A1c is elevated, discussed with him to restart twice a day metformin and try to have his labs done and then will follow up in 2 weeks  He is up-to-date on his eye exam which was done few months ago and colonoscopy up-to-date,  He refused for the flu vaccine as he had a reaction in the past
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
## 2019-12-23 ENCOUNTER — OFFICE VISIT (OUTPATIENT)
Dept: ENDOCRINOLOGY | Facility: CLINIC | Age: 59
End: 2019-12-23
Payer: COMMERCIAL

## 2019-12-23 ENCOUNTER — APPOINTMENT (OUTPATIENT)
Dept: LAB | Facility: CLINIC | Age: 59
End: 2019-12-23
Payer: COMMERCIAL

## 2019-12-23 VITALS
WEIGHT: 195 LBS | BODY MASS INDEX: 25.03 KG/M2 | DIASTOLIC BLOOD PRESSURE: 90 MMHG | HEIGHT: 74 IN | HEART RATE: 119 BPM | SYSTOLIC BLOOD PRESSURE: 138 MMHG

## 2019-12-23 DIAGNOSIS — E11.65 TYPE 2 DIABETES MELLITUS WITH HYPERGLYCEMIA, WITHOUT LONG-TERM CURRENT USE OF INSULIN (HCC): Primary | ICD-10-CM

## 2019-12-23 DIAGNOSIS — E11.65 TYPE 2 DIABETES MELLITUS WITH HYPERGLYCEMIA, WITHOUT LONG-TERM CURRENT USE OF INSULIN (HCC): ICD-10-CM

## 2019-12-23 DIAGNOSIS — E66.3 OVERWEIGHT (BMI 25.0-29.9): ICD-10-CM

## 2019-12-23 DIAGNOSIS — E11.9 TYPE 2 DIABETES MELLITUS WITHOUT COMPLICATION, WITHOUT LONG-TERM CURRENT USE OF INSULIN (HCC): ICD-10-CM

## 2019-12-23 DIAGNOSIS — R21 RASH: ICD-10-CM

## 2019-12-23 DIAGNOSIS — E78.00 PURE HYPERCHOLESTEROLEMIA: ICD-10-CM

## 2019-12-23 DIAGNOSIS — E78.2 MIXED HYPERLIPIDEMIA: ICD-10-CM

## 2019-12-23 DIAGNOSIS — Z11.59 NEED FOR HEPATITIS C SCREENING TEST: ICD-10-CM

## 2019-12-23 LAB
ALBUMIN SERPL BCP-MCNC: 4.3 G/DL (ref 3.5–5)
ALP SERPL-CCNC: 105 U/L (ref 46–116)
ALT SERPL W P-5'-P-CCNC: 39 U/L (ref 12–78)
ANION GAP SERPL CALCULATED.3IONS-SCNC: 4 MMOL/L (ref 4–13)
AST SERPL W P-5'-P-CCNC: 18 U/L (ref 5–45)
BASOPHILS # BLD AUTO: 0.09 THOUSANDS/ΜL (ref 0–0.1)
BASOPHILS NFR BLD AUTO: 1 % (ref 0–1)
BILIRUB SERPL-MCNC: 0.66 MG/DL (ref 0.2–1)
BUN SERPL-MCNC: 13 MG/DL (ref 5–25)
CALCIUM ALBUM COR SERPL-MCNC: 10.3 MG/DL (ref 8.3–10.1)
CALCIUM SERPL-MCNC: 10.5 MG/DL (ref 8.3–10.1)
CHLORIDE SERPL-SCNC: 105 MMOL/L (ref 100–108)
CHOLEST SERPL-MCNC: 229 MG/DL (ref 50–200)
CO2 SERPL-SCNC: 29 MMOL/L (ref 21–32)
CREAT SERPL-MCNC: 1.02 MG/DL (ref 0.6–1.3)
CREAT UR-MCNC: 283 MG/DL
EOSINOPHIL # BLD AUTO: 0.54 THOUSAND/ΜL (ref 0–0.61)
EOSINOPHIL NFR BLD AUTO: 6 % (ref 0–6)
ERYTHROCYTE [DISTWIDTH] IN BLOOD BY AUTOMATED COUNT: 12.7 % (ref 11.6–15.1)
EST. AVERAGE GLUCOSE BLD GHB EST-MCNC: 192 MG/DL
GFR SERPL CREATININE-BSD FRML MDRD: 81 ML/MIN/1.73SQ M
GLUCOSE P FAST SERPL-MCNC: 160 MG/DL (ref 65–99)
HBA1C MFR BLD: 8.3 % (ref 4.2–6.3)
HCT VFR BLD AUTO: 45.7 % (ref 36.5–49.3)
HDLC SERPL-MCNC: 44 MG/DL
HGB BLD-MCNC: 15.8 G/DL (ref 12–17)
IMM GRANULOCYTES # BLD AUTO: 0.02 THOUSAND/UL (ref 0–0.2)
IMM GRANULOCYTES NFR BLD AUTO: 0 % (ref 0–2)
LDLC SERPL CALC-MCNC: 167 MG/DL (ref 0–100)
LYMPHOCYTES # BLD AUTO: 2.86 THOUSANDS/ΜL (ref 0.6–4.47)
LYMPHOCYTES NFR BLD AUTO: 32 % (ref 14–44)
MCH RBC QN AUTO: 30.7 PG (ref 26.8–34.3)
MCHC RBC AUTO-ENTMCNC: 34.6 G/DL (ref 31.4–37.4)
MCV RBC AUTO: 89 FL (ref 82–98)
MICROALBUMIN UR-MCNC: 36.4 MG/L (ref 0–20)
MICROALBUMIN/CREAT 24H UR: 13 MG/G CREATININE (ref 0–30)
MONOCYTES # BLD AUTO: 0.73 THOUSAND/ΜL (ref 0.17–1.22)
MONOCYTES NFR BLD AUTO: 8 % (ref 4–12)
NEUTROPHILS # BLD AUTO: 4.67 THOUSANDS/ΜL (ref 1.85–7.62)
NEUTS SEG NFR BLD AUTO: 53 % (ref 43–75)
NONHDLC SERPL-MCNC: 185 MG/DL
NRBC BLD AUTO-RTO: 0 /100 WBCS
PLATELET # BLD AUTO: 296 THOUSANDS/UL (ref 149–390)
PMV BLD AUTO: 9.9 FL (ref 8.9–12.7)
POTASSIUM SERPL-SCNC: 4 MMOL/L (ref 3.5–5.3)
PROT SERPL-MCNC: 8.7 G/DL (ref 6.4–8.2)
RBC # BLD AUTO: 5.14 MILLION/UL (ref 3.88–5.62)
SL AMB POCT GLUCOSE BLD: 162
SODIUM SERPL-SCNC: 138 MMOL/L (ref 136–145)
TRIGL SERPL-MCNC: 88 MG/DL
WBC # BLD AUTO: 8.91 THOUSAND/UL (ref 4.31–10.16)

## 2019-12-23 PROCEDURE — 83036 HEMOGLOBIN GLYCOSYLATED A1C: CPT

## 2019-12-23 PROCEDURE — 82043 UR ALBUMIN QUANTITATIVE: CPT

## 2019-12-23 PROCEDURE — 84681 ASSAY OF C-PEPTIDE: CPT

## 2019-12-23 PROCEDURE — 99204 OFFICE O/P NEW MOD 45 MIN: CPT | Performed by: INTERNAL MEDICINE

## 2019-12-23 PROCEDURE — 83519 RIA NONANTIBODY: CPT

## 2019-12-23 PROCEDURE — 80061 LIPID PANEL: CPT

## 2019-12-23 PROCEDURE — 80053 COMPREHEN METABOLIC PANEL: CPT

## 2019-12-23 PROCEDURE — 82570 ASSAY OF URINE CREATININE: CPT

## 2019-12-23 PROCEDURE — 85025 COMPLETE CBC W/AUTO DIFF WBC: CPT

## 2019-12-23 PROCEDURE — 36415 COLL VENOUS BLD VENIPUNCTURE: CPT

## 2019-12-23 PROCEDURE — 82948 REAGENT STRIP/BLOOD GLUCOSE: CPT

## 2019-12-23 RX ORDER — LANCETS
EACH MISCELLANEOUS
Qty: 100 EACH | Refills: 3 | Status: SHIPPED | OUTPATIENT
Start: 2019-12-23 | End: 2020-03-16 | Stop reason: SDUPTHER

## 2019-12-23 RX ORDER — ECHINACEA 400 MG
CAPSULE ORAL
COMMUNITY

## 2019-12-23 RX ORDER — UBIDECARENONE 75 MG
1000 CAPSULE ORAL DAILY
COMMUNITY

## 2019-12-23 NOTE — PATIENT INSTRUCTIONS
Have labs done fasting    Send me blood sugars every 2 weeks for review     See Dermatology [phone]     Follow-up in 6 weeks

## 2019-12-23 NOTE — PROGRESS NOTES
ENDOCRINOLOGY  NEW PATIENT H&P     ? Reason for Endocrine Consult/Chief Complaint:  Diabetes management        Medical Decision Making:     Impression  1  DM  2  HLD  3  Overweight BMI 25 04  4  Rash       Recommendations:  ?  I discussed the pathophysiology of diabetes and reviewed therapy options based on ADA 2019 guidelines  His fasting blood sugar in office was mildly elevated in the 160s  His glucometer is not working, I gave him a new glucometer and instructed him to check his blood sugar fasting every day and alternate pre meals and q h s  And to send me a blood sugar log in 2 weeks for review  He does not want to go on metformin again, depending on if he is having fasting or postprandial hyperglycemia will decide for further therapy options  He is due for all of his diabetes labs now  He has made some significant dietary changes over the past 1-2 months, will refer to [facility] certified diabetes educator for further dietary counseling  Given his thin body habitus will screen him for MEGHAN and check a fasting C-peptide and a WARREN antibody  Hyperlipidemia-his LDL was elevated in Aug 2018, repeat lipid panel now given his diet has significantly changed    Overweight BMI 25 04-he has significantly changed his diet and exercises frequently at work    Rash-multiple raised erythematous macules on the forearms and lower legs, he has never seen Dermatology, I referred him to Dermatology and I ordered a CBC with differential     RTC in 6 weeks     Tori ROBLEDO  History of Present Illness:    Mr Kerri Haque is a 79-year-old male who presents for diabetes management   fasting this morning     ?  PMH-diabetes, hyperlipidemia  PSH-fracture surgery  FHx-mother with diabetes and sister with diabetes and brother with diabetes  SHx-works at [employer], neg x 3      Type of DM:  2  Age of onset: 10 years   Most recent A1C:  8 1% October 2019  Present home regimen:  Metformin a 1000 mg taken twice a day- was on this for several years, stopped 1 month ago   SMBG at home: glucometer broke   Hypoglycemic events: none   Microvascular complications:  None known  Macrovascular complications:  None known  Nutrition: was drinking a lot of iced coffee before, was eating a lot of [place] before  Exercise: at work walking a lot      ? Review of Systems:   Review of Systems   Constitutional: Negative for appetite change, chills, diaphoresis, fatigue, fever and unexpected weight change  HENT: Negative for congestion, ear pain, hearing loss, rhinorrhea, sinus pressure, sinus pain, sore throat, trouble swallowing and voice change  Eyes: Negative for photophobia, redness and visual disturbance  Respiratory: Negative for apnea, cough, chest tightness, shortness of breath, wheezing and stridor  Cardiovascular: Negative for chest pain, palpitations and leg swelling  Gastrointestinal: Negative for abdominal distention, abdominal pain, constipation, diarrhea, nausea and vomiting  Endocrine: Negative for cold intolerance, heat intolerance, polydipsia, polyphagia and polyuria  Genitourinary: Negative for difficulty urinating, dysuria, flank pain, frequency, hematuria and urgency  Musculoskeletal: Negative for arthralgias, back pain, gait problem, joint swelling and myalgias  Skin: +rash   Allergic/Immunologic: Negative for immunocompromised state  Neurological: Negative for dizziness, tremors, syncope, weakness, light-headedness and headaches  Hematological: Negative for adenopathy  Does not bruise/bleed easily  Psychiatric/Behavioral: Negative for confusion and sleep disturbance  The patient is not nervous/anxious        Patient History:     Past Medical History:   Diagnosis Date    Diabetes mellitus ([facility] [address] )      Past Surgical History:   Procedure Laterality Date    FRACTURE SURGERY       Social History     Socioeconomic History    Marital status: /Civil Union     Spouse name: Not on file    Number of children: Not on file    Years of education: Not on file    Highest education level: Not on file   Occupational History    Not on file   Social Needs    Financial resource strain: Not on file    Food insecurity:     Worry: Not on file     Inability: Not on file    Transportation needs:     Medical: Not on file     Non-medical: Not on file   Tobacco Use    Smoking status: Former Smoker     Types: Cigarettes    Smokeless tobacco: Never Used   Substance and Sexual Activity    Alcohol use: No     Comment: 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)    Drug use: No    Sexual activity: Not on file   Lifestyle    Physical activity:     Days per week: Not on file     Minutes per session: Not on file    Stress: Not on file   Relationships    Social connections:     Talks on phone: Not on file     Gets together: Not on file     Attends [employer] service: Not on file     Active member of club or organization: Not on file     Attends meetings of clubs or organizations: Not on file     Relationship status: Not on file    Intimate partner violence:     Fear of current or ex partner: Not on file     Emotionally abused: Not on file     Physically abused: Not on file     Forced sexual activity: Not on file   Other Topics Concern    Not on file   Social History Narrative    Not on file     Family History   Problem Relation Age of Onset    Diabetes Mother     Diabetes Sister     Diabetes Brother     Pancreatic cancer Sister     Aneurysm Sister         vertebral artery aneurysm       Current Medications: At the time this note was written these were the medications the patient was on    Current Outpatient Medications   Medication Sig Dispense Refill    aspirin (ECOTRIN LOW STRENGTH) 81 mg EC tablet Take 81 mg by mouth daily      calcium carbonate (OS-EDIE) 1250 (500 Ca) MG chewable tablet Chew 2 tablets 2 (two) times a day as needed        clotrimazole-betamethasone (LOTRISONE) 1-0 05 % cream Apply to affected area 2 times daily 15 g 0    metFORMIN (GLUCOPHAGE) 1000 MG tablet Take 1 tablet (1,000 mg total) by mouth 2 (two) times a day 180 tablet 1     No current facility-administered medications for this visit  Allergies: Atorvastatin and Delsym [dextromethorphan]    Physical Exam:   Vital Signs:   /90   Pulse (!) 119   Ht 6' 2" (1 88 m)   Wt 88 5 kg (195 lb)   BMI 25 04 kg/m²     Physical Exam   Constitutional: He is oriented to person, place, and time  He appears well-developed and well-nourished  HENT:   Head: Normocephalic and atraumatic  Nose: Nose normal    Mouth/Throat: Oropharynx is clear and moist  No oropharyngeal exudate  Eyes: Pupils are equal, round, and reactive to light  Conjunctivae and EOM are normal  No scleral icterus  Neck: Normal range of motion  Neck supple  No thyromegaly present  Cardiovascular: Normal rate, regular rhythm and normal heart sounds  Exam reveals no gallop and no friction rub  No murmur heard  Pulmonary/Chest: Effort normal and breath sounds normal  No stridor  No respiratory distress  He has no wheezes  He has no rales  Abdominal: Soft  Bowel sounds are normal  He exhibits no distension and no mass  There is no tenderness  There is no rebound and no guarding  Musculoskeletal: Normal range of motion  He exhibits no edema  Lymphadenopathy:     He has no cervical adenopathy  Neurological: He is alert and oriented to person, place, and time  Skin: Skin is warm and dry  Rash noted  +multiple 1-4cm erythematous macules on forearm and lower legs bilaterally, blanchable   Psychiatric: He has a normal mood and affect  His behavior is normal  Judgment and thought content normal         Labs and Imaging:    No recent labs  ?

## 2019-12-25 LAB — C PEPTIDE SERPL-MCNC: 4.3 NG/ML (ref 1.1–4.4)

## 2019-12-27 ENCOUNTER — TELEPHONE (OUTPATIENT)
Dept: ENDOCRINOLOGY | Facility: CLINIC | Age: 59
End: 2019-12-27

## 2019-12-27 DIAGNOSIS — E83.52 HYPERCALCEMIA: ICD-10-CM

## 2019-12-27 DIAGNOSIS — R77.8 ELEVATED TOTAL PROTEIN: Primary | ICD-10-CM

## 2019-12-27 LAB — GAD65 AB SER-ACNC: <5 U/ML (ref 0–5)

## 2019-12-27 NOTE — TELEPHONE ENCOUNTER
Charlie,    Please call pt with his labs    -his A1C diabetes test is still high 8 3%, send me BG log in 1 week for review to see if he needs to start another diabetes medication  -his urine protein was mildly elevated likely from high blood sugars  -his calcium was mildly elevated as well 10 3, also his total protein was elevated at 8 7 as well, we need to repeat this in 2 weeks to make sure normalized (ordered in EMR, he does not have to fast for this)  -his bad cholesterol LDL was elevated 167, generally we start cholesterol medication with that level of LDL, if he wishes to start atorvastatin I can prescribe it, goal is to get the LDL to <100 to protect his heart    Joanna ROBLEDO    Endocrinology     Component      Latest Ref Rng & Units 12/23/2019   WBC      4 31 - 10 16 Thousand/uL 8 91   Red Blood Cell Count      3 88 - 5 62 Million/uL 5 14   Hemoglobin      12 0 - 17 0 g/dL 15 8   HCT      36 5 - 49 3 % 45 7   MCV      82 - 98 fL 89   MCH      26 8 - 34 3 pg 30 7   MCHC      31 4 - 37 4 g/dL 34 6   RDW      11 6 - 15 1 % 12 7   MPV      8 9 - 12 7 fL 9 9   Platelet Count      414 - 390 Thousands/uL 296   nRBC      /100 WBCs 0   Neutrophils %      43 - 75 % 53   Immat GRANS %      0 - 2 % 0   Lymphocytes Relative      14 - 44 % 32   Monocytes Relative      4 - 12 % 8   Eosinophils      0 - 6 % 6   Basophils Relative      0 - 1 % 1   Absolute Neutrophils      1 85 - 7 62 Thousands/µL 4 67   Immature Grans Absolute      0 00 - 0 20 Thousand/uL 0 02   Lymphocytes Absolute      0 60 - 4 47 Thousands/µL 2 86   Absolute Monocytes      0 17 - 1 22 Thousand/µL 0 73   Absolute Eosinophils      0 00 - 0 61 Thousand/µL 0 54   Basophils Absolute      0 00 - 0 10 Thousands/µL 0 09   Sodium      136 - 145 mmol/L 138   Potassium      3 5 - 5 3 mmol/L 4 0   Chloride      100 - 108 mmol/L 105   CO2      21 - 32 mmol/L 29   Anion Gap      4 - 13 mmol/L 4   BUN      5 - 25 mg/dL 13   Creatinine      0 60 - 1 30 mg/dL 1 02 GLUCOSE FASTING      65 - 99 mg/dL 160 (H)   Calcium      8 3 - 10 1 mg/dL 10 5 (H)   CORRECTED CALCIUM      8 3 - 10 1 mg/dL 10 3 (H)   AST      5 - 45 U/L 18   ALT      12 - 78 U/L 39   Alkaline Phosphatase      46 - 116 U/L 105   Total Protein      6 4 - 8 2 g/dL 8 7 (H)   Albumin      3 5 - 5 0 g/dL 4 3   TOTAL BILIRUBIN      0 20 - 1 00 mg/dL 0 66   eGFR      ml/min/1 73sq m 81   Cholesterol      50 - 200 mg/dL 229 (H)   Triglycerides      <=150 mg/dL 88   HDL      >=40 mg/dL 44   LDL Direct      0 - 100 mg/dL 167 (H)   Non-HDL Cholesterol      mg/dl 185   EXT Creatinine Urine      mg/dL 283 0   MICROALBUM ,U,RANDOM      0 0 - 20 0 mg/L 36 4 (H)   MICROALBUMIN/CREATININE RATIO      0 - 30 mg/g creatinine 13   Hemoglobin A1C      4 2 - 6 3 % 8 3 (H)   EAG      mg/dl 192   C-PEPTIDE      1 1 - 4 4 ng/mL 4 3

## 2019-12-27 NOTE — TELEPHONE ENCOUNTER
Spoke with patient  He will have the lab test done and send in bs log for review  He wants no part of taking anything to help with the LDL and he currently has stopped taking metformin  He heard too much bad stuff about that drug

## 2019-12-30 ENCOUNTER — CONSULT (OUTPATIENT)
Dept: DERMATOLOGY | Facility: CLINIC | Age: 59
End: 2019-12-30
Payer: COMMERCIAL

## 2019-12-30 ENCOUNTER — HOSPITAL ENCOUNTER (EMERGENCY)
Facility: HOSPITAL | Age: 59
Discharge: HOME | End: 2019-12-30
Attending: EMERGENCY MEDICINE
Payer: COMMERCIAL

## 2019-12-30 ENCOUNTER — APPOINTMENT (EMERGENCY)
Dept: RADIOLOGY | Facility: HOSPITAL | Age: 59
End: 2019-12-30
Payer: COMMERCIAL

## 2019-12-30 VITALS
WEIGHT: 192.8 LBS | HEIGHT: 74 IN | BODY MASS INDEX: 24.74 KG/M2 | TEMPERATURE: 98.8 F | DIASTOLIC BLOOD PRESSURE: 84 MMHG | SYSTOLIC BLOOD PRESSURE: 168 MMHG | HEART RATE: 75 BPM

## 2019-12-30 VITALS
RESPIRATION RATE: 18 BRPM | HEART RATE: 68 BPM | OXYGEN SATURATION: 99 % | BODY MASS INDEX: 24.76 KG/M2 | SYSTOLIC BLOOD PRESSURE: 158 MMHG | HEIGHT: 74 IN | WEIGHT: 192.9 LBS | TEMPERATURE: 97.7 F | DIASTOLIC BLOOD PRESSURE: 77 MMHG

## 2019-12-30 DIAGNOSIS — D48.5 NEOPLASM OF UNCERTAIN BEHAVIOR OF SKIN: ICD-10-CM

## 2019-12-30 DIAGNOSIS — L82.1 SEBORRHEIC KERATOSIS: ICD-10-CM

## 2019-12-30 DIAGNOSIS — R55 VASOVAGAL SYNCOPE: Primary | ICD-10-CM

## 2019-12-30 DIAGNOSIS — R21 RASH: ICD-10-CM

## 2019-12-30 DIAGNOSIS — L81.4 LENTIGO: ICD-10-CM

## 2019-12-30 DIAGNOSIS — L21.9 SEBORRHEIC DERMATITIS: Primary | ICD-10-CM

## 2019-12-30 LAB
ALBUMIN SERPL BCP-MCNC: 3.9 G/DL (ref 3.5–5)
ALP SERPL-CCNC: 88 U/L (ref 46–116)
ALT SERPL W P-5'-P-CCNC: 30 U/L (ref 12–78)
ANION GAP SERPL CALCULATED.3IONS-SCNC: 8 MMOL/L (ref 4–13)
AST SERPL W P-5'-P-CCNC: 24 U/L (ref 5–45)
ATRIAL RATE: 66 BPM
BASOPHILS # BLD AUTO: 0.06 THOUSANDS/ΜL (ref 0–0.1)
BASOPHILS NFR BLD AUTO: 1 % (ref 0–1)
BILIRUB SERPL-MCNC: 0.41 MG/DL (ref 0.2–1)
BUN SERPL-MCNC: 16 MG/DL (ref 5–25)
CALCIUM SERPL-MCNC: 9.9 MG/DL (ref 8.3–10.1)
CHLORIDE SERPL-SCNC: 105 MMOL/L (ref 100–108)
CO2 SERPL-SCNC: 26 MMOL/L (ref 21–32)
CREAT SERPL-MCNC: 0.99 MG/DL (ref 0.6–1.3)
EOSINOPHIL # BLD AUTO: 0.28 THOUSAND/ΜL (ref 0–0.61)
EOSINOPHIL NFR BLD AUTO: 3 % (ref 0–6)
ERYTHROCYTE [DISTWIDTH] IN BLOOD BY AUTOMATED COUNT: 12.6 % (ref 11.6–15.1)
GFR SERPL CREATININE-BSD FRML MDRD: 84 ML/MIN/1.73SQ M
GLUCOSE SERPL-MCNC: 213 MG/DL (ref 65–140)
HCT VFR BLD AUTO: 42.8 % (ref 36.5–49.3)
HGB BLD-MCNC: 14.4 G/DL (ref 12–17)
IMM GRANULOCYTES # BLD AUTO: 0.02 THOUSAND/UL (ref 0–0.2)
IMM GRANULOCYTES NFR BLD AUTO: 0 % (ref 0–2)
LYMPHOCYTES # BLD AUTO: 2.92 THOUSANDS/ΜL (ref 0.6–4.47)
LYMPHOCYTES NFR BLD AUTO: 34 % (ref 14–44)
MCH RBC QN AUTO: 30.1 PG (ref 26.8–34.3)
MCHC RBC AUTO-ENTMCNC: 33.6 G/DL (ref 31.4–37.4)
MCV RBC AUTO: 90 FL (ref 82–98)
MONOCYTES # BLD AUTO: 0.54 THOUSAND/ΜL (ref 0.17–1.22)
MONOCYTES NFR BLD AUTO: 6 % (ref 4–12)
NEUTROPHILS # BLD AUTO: 4.85 THOUSANDS/ΜL (ref 1.85–7.62)
NEUTS SEG NFR BLD AUTO: 56 % (ref 43–75)
NRBC BLD AUTO-RTO: 0 /100 WBCS
P AXIS: 65 DEGREES
PLATELET # BLD AUTO: 311 THOUSANDS/UL (ref 149–390)
PMV BLD AUTO: 9.7 FL (ref 8.9–12.7)
POTASSIUM SERPL-SCNC: 4.5 MMOL/L (ref 3.5–5.3)
PR INTERVAL: 152 MS
PROT SERPL-MCNC: 8.1 G/DL (ref 6.4–8.2)
QRS AXIS: 60 DEGREES
QRSD INTERVAL: 98 MS
QT INTERVAL: 386 MS
QTC INTERVAL: 404 MS
RBC # BLD AUTO: 4.78 MILLION/UL (ref 3.88–5.62)
SODIUM SERPL-SCNC: 139 MMOL/L (ref 136–145)
T WAVE AXIS: 59 DEGREES
TROPONIN I SERPL-MCNC: <0.02 NG/ML
VENTRICULAR RATE: 66 BPM
WBC # BLD AUTO: 8.67 THOUSAND/UL (ref 4.31–10.16)

## 2019-12-30 PROCEDURE — 36415 COLL VENOUS BLD VENIPUNCTURE: CPT

## 2019-12-30 PROCEDURE — 88312 SPECIAL STAINS GROUP 1: CPT | Performed by: STUDENT IN AN ORGANIZED HEALTH CARE EDUCATION/TRAINING PROGRAM

## 2019-12-30 PROCEDURE — 84484 ASSAY OF TROPONIN QUANT: CPT | Performed by: EMERGENCY MEDICINE

## 2019-12-30 PROCEDURE — 99284 EMERGENCY DEPT VISIT MOD MDM: CPT

## 2019-12-30 PROCEDURE — 96360 HYDRATION IV INFUSION INIT: CPT

## 2019-12-30 PROCEDURE — 11103 TANGNTL BX SKIN EA SEP/ADDL: CPT | Performed by: DERMATOLOGY

## 2019-12-30 PROCEDURE — 11104 PUNCH BX SKIN SINGLE LESION: CPT | Performed by: DERMATOLOGY

## 2019-12-30 PROCEDURE — 93005 ELECTROCARDIOGRAM TRACING: CPT

## 2019-12-30 PROCEDURE — 88305 TISSUE EXAM BY PATHOLOGIST: CPT | Performed by: STUDENT IN AN ORGANIZED HEALTH CARE EDUCATION/TRAINING PROGRAM

## 2019-12-30 PROCEDURE — 71046 X-RAY EXAM CHEST 2 VIEWS: CPT

## 2019-12-30 PROCEDURE — 80053 COMPREHEN METABOLIC PANEL: CPT | Performed by: EMERGENCY MEDICINE

## 2019-12-30 PROCEDURE — 93010 ELECTROCARDIOGRAM REPORT: CPT | Performed by: INTERNAL MEDICINE

## 2019-12-30 PROCEDURE — 85025 COMPLETE CBC W/AUTO DIFF WBC: CPT | Performed by: EMERGENCY MEDICINE

## 2019-12-30 PROCEDURE — 99203 OFFICE O/P NEW LOW 30 MIN: CPT | Performed by: DERMATOLOGY

## 2019-12-30 PROCEDURE — 99285 EMERGENCY DEPT VISIT HI MDM: CPT | Performed by: EMERGENCY MEDICINE

## 2019-12-30 RX ORDER — KETOCONAZOLE 20 MG/ML
SHAMPOO TOPICAL
Qty: 120 ML | Refills: 6 | Status: SHIPPED | OUTPATIENT
Start: 2019-12-30 | End: 2020-01-09

## 2019-12-30 RX ORDER — CLOBETASOL PROPIONATE 0.5 MG/G
OINTMENT TOPICAL
Qty: 60 G | Refills: 2 | Status: SHIPPED | OUTPATIENT
Start: 2019-12-30 | End: 2020-01-10 | Stop reason: ALTCHOICE

## 2019-12-30 RX ADMIN — SODIUM CHLORIDE 1000 ML: 0.9 INJECTION, SOLUTION INTRAVENOUS at 14:04

## 2019-12-30 NOTE — PATIENT INSTRUCTIONS
1 Seborrheic Keratosis  A seborrheic keratosis is a harmless warty spot that appears during adult life as a common sign of skin aging  Seborrheic keratoses can arise on any area of skin, covered or uncovered, with the usual exception of the palms and soles  They do not arise from mucous membranes  Seborrheic keratoses can have highly variable appearance  Seborrheic keratoses are extremely common  It has been estimated that over 90% of adults over the age of 61 years have one or more of them  They occur in males and females of all races, typically beginning to erupt in the 35s or 45s  They are uncommon under the age of 21 years  The precise cause of seborrhoeic keratoses is not known  Seborrhoeic keratoses are considered degenerative in nature  As time goes by, seborrheic keratoses tend to become more numerous  Some people inherit a tendency to develop a very large number of them; some people may have hundreds of them  The name "seborrheic keratosis" is misleading, because these lesions are not limited to a seborrhoeic distribution (scalp, mid-face, chest, upper back), nor are they formed from sebaceous glands, nor are they associated with sebum -- which is greasy  Seborrheic keratosis may also be called "SK," "Seb K," "basal cell papilloma," "senile wart," or "barnacle "      Researchers have noted:   Eruptive seborrhoeic keratoses can follow sunburn or dermatitis   Skin friction may be the reason they appear in body folds   Viral cause (e g , human papillomavirus) seems unlikely   Stable and clonal mutations or activation of FRFR3, PIK3CA, MARIA G, AKT1 and EGFR genes are found in seborrhoeic keratoses   Seborrhoeic keratosis can arise from solar lentigo   FRFR3 mutations also arise in solar lentigines   These mutations are associated with increased age and location on the head and neck, suggesting a role of ultraviolet radiation in these lesions   Seborrheic keratoses do not harbour tumour suppressor gene mutations   Epidermal growth factor receptor inhibitors, which are used to treat some cancers, often result in an increase in verrucal (warty) keratoses  There is no easy way to remove multiple lesions on a single occasion  Unless a specific lesion is "inflamed" and is causing pain or stinging/burning or is bleeding, most insurance companies do not authorize treatment  2 Based on a thorough discussion of this condition and the management approach to it (including a comprehensive discussion of the known risks, side effects and potential benefits of treatment), the patient (family) agrees to implement the following specific plan:    Apply topically to entire body 3 times a day for sun protection          What is a lentigo? A lentigo is a pigmented flat or slightly raised lesion with a clearly defined edge  Unlike an ephelis (freckle), it does not fade in the winter months  There are several kinds of lentigo  The name lentigo originally referred to its appearance resembling a small lentil  The plural of lentigo is lentigines, although lentigos is also in common use  Who gets lentigines? Lentigines can affect males and females of all ages and races  Solar lentigines are especially prevalent in fair skinned adults  Lentigines associated with syndromes are present at birth or arise during childhood  What causes lentigines? Common forms of lentigo are due to exposure to ultraviolet radiation:   Sun damage including sunburn    Indoor tanning    Phototherapy, especially photochemotherapy (PUVA)    Ionizing radiation, eg radiation therapy, can also cause lentigines  Several familial syndromes associated with widespread lentigines originate from mutations in Robin-MAP kinase, mTOR signaling and PTEN pathways  What are the clinical features of lentigines?   Lentigines have been classified into several different types depending on what they look like, where they appear on the body, causative factors, and whether they are associated to other diseases or conditions  Lentigines may be solitary or more often, multiple  Most lentigines are smaller than 5 mm in diameter      Lentigo simplex   A precursor to junctional naevus    Arises during childhood and early adult life    Found on trunk and limbs    Small brown round or oval macule or thin plaque    Jagged or smooth edge    May have a dry surface    May disappear in time  Solar lentigo   A precursor to seborrhoeic keratosis    Found on chronically sun exposed sites such as hands, face, lower legs    May also follow sunburn to shoulders    Yellow, light or dark brown regular or irregular macule or thin plaque    May have a dry surface    Often has moth-eaten outline    Can slowly enlarge to several centimeters in diameter    May disappear, often through the process known as lichenoid keratosis    When atypical in appearance, may be difficult to distinguish from melanoma in situ  Ink spot lentigo   Also known as reticulated lentigo    Few in number compared to solar lentigines    Follows sunburn in very fair skinned individuals    Dark brown to black irregular ink spot-like macule  PUVA lentigo   Similar to ink spot lentigo but follows photochemotherapy (PUVA)    Location anywhere exposed to PUVA  Tanning bed lentigo   Similar to ink spot lentigo but follows indoor tanning    Location anywhere exposed to tanning bed  Radiation lentigo   Occurs in site of irradiation (accidental or therapeutic)    Associated with late-stage radiation dermatitis: epidermal atrophy, subcutaneous fibrosis, keratosis, telangiectasias  Melanotic macule   Mucosal surfaces or adjacent glabrous skin eg lip, vulva, penis, anus    Light to dark brown    Also called mucosal melanosis  Generalised lentigines   Found on any exposed or covered site from early childhood    Small macules may merge to form larger patches    Not associated with a syndrome  Also called lentigines profusa, multiple lentigines  Agminated lentigines   Naevoid eruption of lentigos confined to a single segmental area    Sharp demarcation in midline    May have associated neurological and developmental abnormalities  Patterned lentigines   Inherited tendency to lentigines on face, lips, buttocks, palms, soles    Recognised mainly in people of  ethnicity  Centrofacial neurodysraphic lentiginosis  Associated with mental retardation  Lentiginosis syndromes   Syndromes include LEOPARD/Dennison, Peutz-Jeghers, Laugier-Hunziker, Moynahan, Xeroderma pigmentosum, myxoma syndromes (CORTES, NAME, Zhao), Ruvalcaba-Myhre-Blanco, Bannayan-Zonnana syndrome, Cowden disease (multiple hamartoma syndrome )    Inheritance is autosomal dominant; sporadic cases common    Widespread lentigines present at birth or arise in early childhood    Associated with neural, endocrine, and mesenchymal tumors    How is the diagnosis made? Lentigines are usually diagnosed clinically by their typical appearance  Concern regarding possibility of melanoma may lead to:   Dermatoscopy    Diagnostic excision biopsy    Histopathology of a lentigo shows:   Thickened epidermis    An increased number of melanocytes along the basal layer of epidermis    Unlike junctional melanocytic naevus, there are no nests of melanocytes    Increased melanin pigment within the keratinocytes    Additional features depending on type of lentigo    In contrast, an ephelis (freckle) shows sun-induced increased melanin within the keratinocytes, without an increase in number of cells  What is the treatment for lentigines? Most lentigines are left alone  Attempts to lighten them may not be successful   The following approaches are used:   SPF 50+ broad-spectrum sunscreen    Hydroquinone bleaching cream    Alpha hydroxy acids    Vitamin C    Retinoids    Azelaic acid    Chemical peels  Individual lesions can be permanently removed using:   Cryotherapy    Intense pulsed light    Pigment lasers    How can lentigines be prevented? Lentigines associated with exposure ultraviolet radiation can be prevented by very careful sun protection  Clothing is more successful at preventing new lentigines than are sunscreens  What is the outlook for lentigines? Lentigines usually persist  They may increase in number with age and sun exposure  Some in sun-protected sites may fade and disappear  3 Based on a thorough discussion of this condition and the management approach to it (including a comprehensive discussion of the known risks, side effects and potential benefits of treatment), the patient (family) agrees to implement the following specific plan:   Ketoconazole shampoo apply topically to face and  Lather daily; leave on for 5 mintues and rinse off       Seborrheic Dermatitis   Seborrheic dermatitis is a common, chronic or relapsing form of eczema/dermatitis that mainly affects the sebaceous, gland-rich regions of the scalp, face, and trunk  There are infantile and adult forms of seborrhoeic dermatitis  It is sometimes associated with psoriasis and, in that clinical scenario, may be referred to as "sebo-psoriasis "  Seborrheic dermatitis is also known as "seborrheic eczema "  Dandruff (also called "pityriasis capitis") is an uninflamed form of seborrhoeic dermatitis  Dandruff presents as bran-like scaly patches scattered within hair-bearing areas of the scalp  In an infant, this condition may be referred to as "cradle cap "  The cause of seborrheic dermatitis is not completely understood  It is associated with proliferation of various species of the skin commensal Malassezia, in its yeast (non-pathogenic) form  Its metabolites (such as the fatty acids oleic acid, malssezin, and indole-3-carbaldehyde) may cause an inflammatory reaction   Differences in skin barrier lipid content and function may account for individual presentations  Infantile Seborrheic Dermatitis  Infantile seborrheic dermatitis affects babies under the age of 1 months and usually resolves by 1012 months of age  Infantile seborrheic dermatitis causes "cradle cap" (diffuse, greasy scaling on scalp)  The rash may spread to affect armpit and groin folds (a type of "napkin dermatitis")  There may be associated salmon-pink colored patches that may flake or peel  The rash in this case is usually not especially itchy, so the baby often appears undisturbed by the rash, even when more generalized  Adult Seborrheic Dermatitis  Adult seborrheic dermatitis tends to begin in late adolescence; prevalence is greatest in young adults and in the elderly  It is more common in males than in females  The following factors are sometimes associated with severe adult seborrheic dermatitis:   Oily skin   Familial tendency to seborrhoeic dermatitis or a family history of psoriasis   Immunosuppression: organ transplant recipient, human immunodeficiency virus (HIV) infection and patients with lymphoma   Neurological and psychiatric diseases: Parkinson disease, tardive dyskinesia, depression, epilepsy, facial nerve palsy, spinal cord injury and congenital disorders such as Down syndrome   Treatment for psoriasis with psoralen and ultraviolet A (PUVA) therapy   Lack of sleep   Stressful events  In adults, seborrheic dermatitis may typically affect the scalp, face (creases around the nose, behind ears, within eyebrows) and upper trunk   Typical clinical features include:   Winter flares, improving in summer following sun exposure   Minimal itch most of the time   Combination oily and dry mid-facial skin   Ill-defined localized scaly patches or diffuse scale in the scalp   Blepharitis; scaly red eyelid margins   [place]-pink, thin, scaly, and ill-defined plaques in skin folds on both sides of the face   Petal or ring-shaped flaky patches on hair-line and on anterior chest   Rash in armpits, under the breasts, in the groin folds and genital creases   Superficial folliculitis (inflamed hair follicles) on cheeks and upper trunk    Seborrheic dermatitis is diagnosed by its clinical appearance and behavior  Skin biopsy may be helpful but is rarely necessary to make this diagnosis

## 2019-12-30 NOTE — ED PROVIDER NOTES
History  Chief Complaint   Patient presents with    Syncope     syncope x 2 after getting local anesthesia at a dermatology appointment  Pt didn't eat prior to appointment  51-year-old male with past medical history of diabetes mellitus on metformin who is presenting after 2 syncopal episodes  Patient reports that he did not have breakfast this morning  He went to an appointment at the Dermatology office for biopsies of a rash that he has had for the past several months  Patient states that he was injected with lidocaine and then a punch biopsy was performed  The patient then rapidly sat up and became lightheaded and woozy    He then had his vision go black and he lost consciousness  This event was witnessed by staff at the Dermatology office  Patient regained consciousness and then rapidly sat up  He had another similar syncopal episode  The RN who witnessed the episodes reported that there was a very short period of myoclonic jerking after the syncopal episodes  No seizure-like activity  No tongue biting or urinary incontinence  Patient reports that he had a syncopal episode after taking dextromethorphan in 2018  Patient denies any prodrome of headache, new weakness or numbness, chest pain, palpitations, or shortness of breath  He denies these symptoms at this time  He just feels generally weak  Patient otherwise has no complaints  He sustained no injury from these syncopal episodes; he was helped back by facility staff and did not hit his head  Prior to Admission Medications   Prescriptions Last Dose Informant Patient Reported? Taking?    ACCU-CHEK FASTCLIX LANCETS MISC   No No   Sig: Use 1 lancet a day to check blood sugar, E11 65   Flaxseed, Linseed, (FLAXSEED OIL) 1000 MG CAPS   Yes No   Sig: Take by mouth   aspirin (ECOTRIN LOW STRENGTH) 81 mg EC tablet  Self Yes No   Sig: Take 81 mg by mouth daily   calcium carbonate (OS-EDIE) 1250 (500 Ca) MG chewable tablet  Self Yes No   Sig: Chew 2 tablets 2 (two) times a day as needed     clobetasol (TEMOVATE) 0 05 % ointment   No No   Sig: Apply topically twice a day to affected areas   clotrimazole-betamethasone (LOTRISONE) 1-0 05 % cream   No No   Sig: Apply to affected area 2 times daily   Patient not taking: Reported on 12/23/2019   cyanocobalamin (VITAMIN B-12) 100 mcg tablet   Yes No   Sig: Take 1,000 mcg by mouth daily   glucose blood (ACCU-CHEK GUIDE) test strip   No No   Sig: Use 1 test strip a day to check blood sugar, E11 65   ketoconazole (NIZORAL) 2 % shampoo   No No   Sig: Apply topically to face daily and lather; leave in for 5 minutes and rinse off   metFORMIN (GLUCOPHAGE) 1000 MG tablet   No No   Sig: Take 1 tablet (1,000 mg total) by mouth 2 (two) times a day   Patient not taking: Reported on 12/23/2019      Facility-Administered Medications: None       Past Medical History:   Diagnosis Date    [facility]        Past Surgical History:   Procedure Laterality Date    FRACTURE SURGERY         Family History   Problem Relation Age of Onset    Diabetes Mother     Diabetes Sister     Diabetes Brother     Pancreatic cancer Sister     Aneurysm Sister         vertebral artery aneurysm     I have reviewed and agree with the history as documented  Social History     Tobacco Use    Smoking status: Former Smoker     Types: Cigarettes    Smokeless tobacco: Never Used   Substance Use Topics    Alcohol use: No     Comment: 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)    Drug use: No        Review of Systems   Constitutional: Negative for diaphoresis, fever and unexpected weight change  HENT: Negative for congestion, rhinorrhea and sore throat  Eyes: Negative for pain, discharge and visual disturbance  Respiratory: Negative for cough, shortness of breath and wheezing  Cardiovascular: Negative for chest pain, palpitations and leg swelling     Gastrointestinal: Negative for abdominal pain, blood in stool, constipation, diarrhea, nausea and vomiting  Genitourinary: Negative for dysuria, flank pain and hematuria  Musculoskeletal: Negative for arthralgias and myalgias  Skin: Negative for rash and wound  Allergic/Immunologic: Negative for environmental allergies and food allergies  Neurological: Positive for syncope  Negative for dizziness, seizures, weakness and numbness  Hematological: Negative for adenopathy  Psychiatric/Behavioral: Negative for confusion and hallucinations  Physical Exam  ED Triage Vitals [12/30/19 1311]   Temperature Pulse Respirations Blood Pressure SpO2   97 7 °F (36 5 °C) 69 20 134/81 99 %      Temp Source Heart Rate Source Patient Position - Orthostatic VS BP Location FiO2 (%)   Oral Monitor Lying Right arm --      Pain Score       No Pain             Orthostatic Vital Signs  Vitals:    12/30/19 1311 12/30/19 1518   BP: 134/81 158/77   Pulse: 69 68   Patient Position - Orthostatic VS: Lying        Physical Exam   Constitutional: He is oriented to person, place, and time  He appears well-developed and well-nourished  HENT:   Head: Normocephalic and atraumatic  Right Ear: External ear normal    Left Ear: External ear normal    Nose: Nose normal    Eyes: Pupils are equal, round, and reactive to light  EOM are normal    Neck: Normal range of motion  Neck supple  Cardiovascular: Normal rate, regular rhythm and normal heart sounds  No murmur heard  Pulmonary/Chest: Effort normal and breath sounds normal  No respiratory distress  He has no wheezes  He has no rales  Abdominal: Soft  Bowel sounds are normal  He exhibits no distension  There is no tenderness  There is no guarding  Musculoskeletal: Normal range of motion  He exhibits no deformity  Neurological: He is alert and oriented to person, place, and time  No gross motor deficits noted  Cranial nerves II-XII are intact  Speech is fluent without dysarthria or aphasia  Skin: Skin is warm and dry   Capillary refill takes less than 2 seconds  Rash noted  He is not diaphoretic  There is erythema  There is an erythematous, macular, blanching rash of the bilateral forearms  No vesicles or bullae  Per patient, this has been present for months  Patient also has erythema and scaling of the skin of his face, suggestive of seborrheic dermatitis  Again, this has been present for months  Psychiatric: He has a normal mood and affect  His behavior is normal    Nursing note and vitals reviewed        ED Medications  Medications   sodium chloride 0 9 % bolus 1,000 mL (0 mL Intravenous Stopped 12/30/19 1515)       Diagnostic Studies  Results Reviewed     Procedure Component Value Units Date/Time    Troponin I [[id number]]  (Normal) Collected:  12/30/19 1315    Lab Status:  Final result Specimen:  Blood Updated:  12/30/19 1405     Troponin I <0 02 ng/mL     Comprehensive metabolic panel [[id number]]  (Abnormal) Collected:  12/30/19 1330    Lab Status:  Final result Specimen:  Blood Updated:  12/30/19 1353     Sodium 139 mmol/L      Potassium 4 5 mmol/L      Chloride 105 mmol/L      CO2 26 mmol/L      ANION GAP 8 mmol/L      BUN 16 mg/dL      Creatinine 0 99 mg/dL      Glucose 213 mg/dL      Calcium 9 9 mg/dL      AST 24 U/L      ALT 30 U/L      Alkaline Phosphatase 88 U/L      Total Protein 8 1 g/dL      Albumin 3 9 g/dL      Total Bilirubin 0 41 mg/dL      eGFR 84 ml/min/1 73sq m     Narrative:       Bennett guidelines for Chronic Kidney Disease (CKD):     Stage 1 with normal or high GFR (GFR > 90 mL/min/1 73 square meters)    Stage 2 Mild CKD (GFR = 60-89 mL/min/1 73 square meters)    Stage 3A Moderate CKD (GFR = 45-59 mL/min/1 73 square meters)    Stage 3B Moderate CKD (GFR = 30-44 mL/min/1 73 square meters)    Stage 4 Severe CKD (GFR = 15-29 mL/min/1 73 square meters)    Stage 5 End Stage CKD (GFR <15 mL/min/1 73 square meters)  Note: GFR calculation is accurate only with a steady state creatinine CBC and differential [[id number]] Resulted:  12/30/19 1345    Lab Status:  Final result Specimen:  Blood Updated:  12/30/19 1345     WBC 8 67 Thousand/uL      RBC 4 78 Million/uL      Hemoglobin 14 4 g/dL      Hematocrit 42 8 %      MCV 90 fL      MCH 30 1 pg      MCHC 33 6 g/dL      RDW 12 6 %      MPV 9 7 fL      Platelets 052 Thousands/uL      nRBC 0 /100 WBCs      Neutrophils Relative 56 %      Immat GRANS % 0 %      Lymphocytes Relative 34 %      Monocytes Relative 6 %      Eosinophils Relative 3 %      Basophils Relative 1 %      Neutrophils Absolute 4 85 Thousands/µL      Immature Grans Absolute 0 02 Thousand/uL      Lymphocytes Absolute 2 92 Thousands/µL      Monocytes Absolute 0 54 Thousand/µL      Eosinophils Absolute 0 28 Thousand/µL      Basophils Absolute 0 06 Thousands/µL     Narrative: This is an appended report  These results have been appended to a previously verified report  XR chest 2 views   Final Result by Jamel Milner MD (12/30 6291)      No acute cardiopulmonary disease              Workstation performed: [id number]               Procedures  ECG 12 Lead Documentation Only  Date/Time: 12/30/2019 2:03 PM  Performed by: Gume Arriola MD  Authorized by: Gume Arriola MD     ECG reviewed by me, the ED Provider: yes    Patient location:  ED  Previous ECG:     Previous ECG:  Compared to current    Comparison ECG info:  January 2, 2018    Similarity:  No change    Comparison to cardiac monitor: Yes    Interpretation:     Interpretation: normal    Rate:     ECG rate:  66    ECG rate assessment: normal    Rhythm:     Rhythm: sinus rhythm    Ectopy:     Ectopy: none    QRS:     QRS axis:  Normal    QRS intervals:  Normal  Conduction:     Conduction: normal    ST segments:     ST segments:  Normal  T waves:     T waves: normal            ED Course  ED Course as of Dec 30 1854   Mon Dec 30, 2019   1354 Glucose, Random(!): 213   1354 Hemoglobin: 14 4   1411 Troponin I: <0 02   1436 Patient eating lunch, in no acute distress  Will have him ambulate after he is done eating  1514 Patient ambulated without difficulty  Will discharge  MDM  Number of Diagnoses or Management Options  Vasovagal syncope: new and requires workup  Diagnosis management comments:     History and physical examination suggestive of vasovagal syncope  Syncope occurred in the setting of a dermatologic procedure  Physical examination was unremarkable  Triage RN ordered labs  These were reviewed and were unremarkable  EKG was reviewed and demonstrated normal sinus rhythm with no arrhythmia or ischemia  There was no evidence of WPW, long QT, short QT, hypertrophic cardiomyopathy, Brugada syndrome, or ARVD  The patient was given lunch and observed  He was able to ambulate without difficulty  He was completely asymptomatic throughout his ER course  Patient given strict return precautions  Discussed the results of diagnostic testing ordered during ER evaluation with the patient  I reviewed instructions for discharge as well as return precautions  Patient verbalized understanding of test results, discharge instructions, plan for follow-up, and return precautions           Amount and/or Complexity of Data Reviewed  Clinical lab tests: reviewed  Decide to obtain previous medical records or to obtain history from someone other than the patient: yes  Obtain history from someone other than the patient: yes  Review and summarize past medical records: yes  Independent visualization of images, tracings, or specimens: yes    Risk of Complications, Morbidity, and/or Mortality  Presenting problems: moderate  Diagnostic procedures: minimal  Management options: minimal    Patient Progress  Patient progress: stable        Disposition  Final diagnoses:   Vasovagal syncope     Time reflects when diagnosis was documented in both MDM as applicable and the Disposition within this note Time User Action Codes Description Comment    12/30/2019  3:15 PM Jarrell Doherty Add [R55] Vasovagal syncope       ED Disposition     ED Disposition Condition Date/Time Comment    Discharge Good Mon Dec 30, 2019  3:15 PM Dinorah Sanchez Quick discharge to home/self care  Follow-up Information     Follow up With Specialties Details Why Contact Info Additional Information    Yeny Conley MD Family Medicine Call in 1 day Please follow up with your PCP within 1 week  [address] [address]  [facility] [address]  [phone]       [facility] Emergency Department Emergency Medicine Go to  If symptoms worsen   [address]  [phone]  ED, [address] [place] [address], [address]   [phone]          Discharge Medication List as of 12/30/2019  3:16 PM      START taking these medications    Details   clobetasol (TEMOVATE) 0 05 % ointment Apply topically twice a day to affected areas, Normal      ketoconazole (NIZORAL) 2 % shampoo Apply topically to face daily and lather; leave in for 5 minutes and rinse off, Normal         CONTINUE these medications which have NOT CHANGED    Details   ACCU-CHEK FASTCLIX LANCETS MISC Use 1 lancet a day to check blood sugar, E11 65, Normal      aspirin (ECOTRIN LOW STRENGTH) 81 mg EC tablet Take 81 mg by mouth daily, Historical Med      calcium carbonate (OS-EDIE) 1250 (500 Ca) MG chewable tablet Chew 2 tablets 2 (two) times a day as needed  , Historical Med      cyanocobalamin (VITAMIN B-12) 100 mcg tablet Take 1,000 mcg by mouth daily, Historical Med      Flaxseed, Linseed, (FLAXSEED OIL) 1000 MG CAPS Take by mouth, Historical Med      glucose blood (ACCU-CHEK GUIDE) test strip Use 1 test strip a day to check blood sugar, E11 65, Normal      clotrimazole-betamethasone (LOTRISONE) 1-0 05 % cream Apply to affected area 2 times daily, Print      metFORMIN (GLUCOPHAGE) 1000 MG tablet Take 1 tablet (1,000 mg total) by mouth 2 (two) times a day, Starting Wed 10/30/2019, Normal           No discharge procedures on file  ED Provider  Attending physically available and evaluated Poppy Winchester I managed the patient along with the ED Attending      Electronically Signed by         Tori Raymond MD  12/30/19 7596

## 2019-12-30 NOTE — PROGRESS NOTES
[facility] Dermatology Clinic Note     Patient Name: Luzma Monge  Encounter Date: 12/30/2019    Today's Chief Concerns:  [facility] Concern #1:  Rash   Concern #2: dryness around nose      Past Medical History:  Have you ever had or currently have any of the following medical conditions or treatments? · HIV/AIDS: No  · Hepatitis B: No  · Hepatitis C: No   · Diabetes: YES, stopped metformin and wants to try change in diet   · Tuberculosis: No  · Biologic Therapy/Chemotherapy: No  · Organ or Bone Marrow Transplantation: No  · Radiation Treatment: No  · Cancer (If Yes, which types)- No      Have you ever had any of the following skin conditions? · Melanoma? (If Yes, please provide more detail)- No  · Basal Cell Carcinoma: No  · Squamous Cell Carcinoma: No  · Sebaceous Cell Carcinoma: No  · Merkel Cell Carcinoma: No  · Angiosarcoma: No  · Blistering Sunburns: No  · Eczema: No  · Psoriasis: No    Social History:    What is your current Smoking Status? Former smoker    What is/was your primary occupation? [employer]     What are your hobbies/past-times? Family history:  Do any of your "first degree relatives" (parent, brother, sister, or child) have any of the following conditions? · Melanoma? (If Yes, which relatives?) No  · Eczema: No  · Asthma: No  · Hay Fever/Seasonal Allergies: No  · Psoriasis: No  · Arthritis: No  · Thyroid Problems: No  · Lupus/Connective Tissue Disease: No  · Diabetes: YES  · Stroke: No  · Blood Clots: No  · IBD/Crohn's/Ulcerative Colitis: No  · Vitiligo: No  · Scarring/Keloids: No  · Severe Acne: No  · Pancreatic Cancer: YES, sister  · Other known Skin Condition? If Yes, what condition and which relatives?   No    Current Medications:    Current Outpatient Medications:     ACCU-CHEK FASTCLIX LANCETS MISC, Use 1 lancet a day to check blood sugar, E11 65, Disp: 100 each, Rfl: 3    aspirin (ECOTRIN LOW STRENGTH) 81 mg EC tablet, Take 81 mg by mouth daily, Disp: , Rfl:     calcium carbonate (OS-EDIE) 1250 (500 Ca) MG chewable tablet, Chew 2 tablets 2 (two) times a day as needed  , Disp: , Rfl:     cyanocobalamin (VITAMIN B-12) 100 mcg tablet, Take 1,000 mcg by mouth daily, Disp: , Rfl:     Flaxseed, Linseed, (FLAXSEED OIL) 1000 MG CAPS, Take by mouth, Disp: , Rfl:     glucose blood (ACCU-CHEK GUIDE) test strip, Use 1 test strip a day to check blood sugar, E11 65, Disp: 100 each, Rfl: 3    clotrimazole-betamethasone (LOTRISONE) 1-0 05 % cream, Apply to affected area 2 times daily (Patient not taking: Reported on 12/23/2019), Disp: 15 g, Rfl: 0    metFORMIN (GLUCOPHAGE) 1000 MG tablet, Take 1 tablet (1,000 mg total) by mouth 2 (two) times a day (Patient not taking: Reported on 12/23/2019), Disp: 180 tablet, Rfl: 1    Specific Alerts:    Have you been seen by a [facility] Dermatologist in the last 3 years? No    Are you pregnant or planning to become pregnant? N/A    Are you currently or planning to be nursing or breast feeding? N/A    Allergies   Allergen Reactions    Delsym [Dextromethorphan] Seizures    Atorvastatin Hives       May we call your Preferred Phone number to discuss your specific medical information? YES    May we leave a detailed message that includes your specific medical information? YES    Have you traveled outside of the [facility] in the past 3 months? No    Do you currently have a pacemaker or defibrillator? No    Do you have any artificial heart valves, joints, plates, screws, rods, stents, pins, etc? No   - If Yes, were any placed within the last 2 years? Do you require any medications prior to a surgical procedure? No   - If Yes, for which procedure? - If Yes, what medications to you require? Are you taking any medications that cause you to bleed more easily ("blood thinners") No    Have you ever experienced a rapid heartbeat with epinephrine? No    Have you ever been treated with "gold" (gold sodium thiomalate) therapy?  No    [facility] can help with wrinkles, "laugh lines," facial volume loss, "double chin," "love handles," age spots, and more  Are you interested in learning today about some of the skin enhancement procedures that we offer? (If Yes, please provide more detail) No    Review of Systems:  Have you recently had or currently have any of the following? · Fever or chills: No  · Night Sweats: No  · Headaches: No  · Weight Gain: No  · Weight Loss: YES  · Blurry Vision: No  · Nausea: No  · Vomiting: No  · Diarrhea: No  · Blood in Stool: No  · Abdominal Pain: No  · Itchy Skin: YES  · Painful Joints: No  · Swollen Joints: No  · Muscle Pain: No  · Irregular Mole: No  · Sun Burn: No  · Dry Skin: No  · Skin Color Changes: No  · Scar or Keloid: No  · Cold Sores/Fever Blisters: No  · Bacterial Infections/MRSA: No  · Anxiety: No  · Depression: No  · Suicidal or Homicidal Thoughts: No      PHYSICAL EXAM:      Was a chaperone (Derm Clinical Assistant) present for the entirety of the Physical Exam? YES    Did the Dermatology Team specifically ask and  the patient on the importance of a Full Skin Exam to be sure that nothing is missed clinically?  YES    Did the patient request or accept a Full Skin Exam?  YES    Did the patient specifically refuse to have the areas "under-the-bra" examined by the Dermatologist? No    Did the patient specifically refuse to have the areas "under-the-underwear" examined by the Dermatologist? YES      CONSTITUTIONAL:   Vitals:    12/30/19 1122   BP: 168/84   BP Location: Left arm   Patient Position: Supine   Cuff Size: Standard   Pulse: 75   Temp: 98 8 °F (37 1 °C)   Weight: 87 5 kg (192 lb 12 8 oz)   Height: 6' 2" (1 88 m)       Glucose Checked: 189    PSYCH: Normal mood and affect  EYES: Normal conjunctiva  ENT: Normal lips and oral mucosa  CARDIOVASCULAR: No edema  RESPIRATORY: Normal respirations      FULL ORGAN SYSTEM SKIN EXAM (SKIN)  Hair, Scalp, Ears, Face Normal except as noted below in Assessment   Neck, Cervical Chain Nodes Normal except as noted below in Assessment   Right Arm/Hand/Fingers Normal except as noted below in Assessment   Left Arm/Hand/Fingers Normal except as noted below in Assessment   Chest/Breasts/Axillae Viewed areas Normal except as noted below in Assessment   Abdomen, Umbilicus Normal except as noted below in Assessment   Back/Spine Normal except as noted below in Assessment   Groin/Genitalia/Buttocks declined   Right Leg, Foot, Toes Normal except as noted below in Assessment   Left Leg, Foot, Toes Normal except as noted below in Assessment        ASSESSMENT AND PLAN BY DIAGNOSIS:    History of Present Condition:     Duration:  How long has this been an issue for you?    o  august    Location Affected:  Where on the body is this affecting you?    o  arms, legs and face   Quality:  Is there any bleeding, pain, itch, burning/irritation, or redness associated with the skin lesion? o  itching   Severity:  Describe any bleeding, pain, itch, burning/irritation, or redness on a scale of 1 to 10 (with 10 being the worst)  o  6   Timing:  Does this condition seem to be there pretty constantly or do you notice it more at specific times throughout the day? o  consistent    Context:  Have you ever noticed that this condition seems to be associated with specific activities you do?    o  denies   Modifying Factors:    o Anything that seems to make the condition worse?    - deneis  o What have you tried to do to make the condition better? -  A&D ointment and Lotrisone cream   Associated Signs and Symptoms:  Does this skin lesion seem to be associated with any of the following:  o  DERM ASSOCIATED SIGNS AND SYMPTOMS: Redness and Itching and Scratching     5   RASH    Physical Exam:   (Anatomic Location); (Size and Morphological Description); (Differential Diagnosis):  o Pink plaques with punctate red papules within; bilateral forearms more then upper arms, bilateral lower legs, left leg lesion with a yellow hue: Differential diagnosis Necrobiosis lipoidica, versus psoriasis versus sarcoidosis versus necrobiotic xanthogranuloma      Additional History of Present Condition:  Patient states rash started after he closed down his pool in August  Patient does state rash is itchy and has been consistent since August  Patient has been applying A&D ointment and does slightly decreases the itching  Assessment and Plan:  Based on a thorough discussion of this condition and the management approach to it (including a comprehensive discussion of the known risks, side effects and potential benefits of treatment), the patient (family) agrees to implement the following specific plan:   Start clobetasol ointment apply topically to affected area twice as day     PROCEDURE NOTE:  PUNCH BIOPSY: SPECIMEN B      Performing Physician: Jamison Bruce     Anatomic Location; Clinical Description with size (cm); Pre-Op Diagnosis:   o  Right forearm Pink plaques with punctate red papules within Differential diagnosis Necrobiosis lipoidica, versus psoriasis versus sarcoidosis versus necrobiotic xanthogranuloma       Anesthesia: 1% xylocaine with epi       Topical anesthesia: None       Indications: To indicate diagnosis and management plan  Procedure Details     Patient informed of the risks (including bleeding,scaring and infection) and benefits of the procedure explained  Verbal and written informed consent obtained  The area was prepped and draped in the usual fashion  Anesthesia was obtained with 1% lidocaine with epinephrine  The skin was then stretched perpendicular to the skin tension lines and a punch biopsy to an appropriate sampling depth was obtained with a 4 mm punch with a forceps and iris scissors  Hemostasis was obtained with 4-0 Vicryl x 1 sutures  Complications:  None      Specimen has been sent for review by Dermatopathology  Plan:  1   Instructed to keep the wound dry and covered for 24-48h and clean thereafter  2  Warning signs of infection were reviewed  3  Recommended that the patient use acetaminophen as needed for pain        Standard post-procedure care has been explained and has been included in written form within the patient's copy of Informed Consent  1  SEBORRHEIC KERATOSIS; NON-INFLAMED    Physical Exam:   Anatomic Location Affected:  Back, extremities   Morphological Description:  Flat and raised, waxy, smooth to warty textured, yellow to brownish-grey to dark brown to blackish, discrete, "stuck-on" appearing papules  Additional History of Present Condition:  Patient reports new bumps on the skin  Denies itch, burn, pain, bleeding or ulceration  Present constantly; nothing seems to make it worse or better  No prior treatment  Assessment and Plan:  Based on a thorough discussion of this condition and the management approach to it (including a comprehensive discussion of the known risks, side effects and potential benefits of treatment), the patient (family) agrees to implement the following specific plan:  Monitor for changes  Benign, reassured           2   LENTIGO    Physical Exam:   Anatomic Location Affected:  Trunk, extremities   Morphological Description:  Light tan macules    Additional History of Present Condition:  Patient states he does get sun exposure since he has a pool,     Assessment and Plan:  Based on a thorough discussion of this condition and the management approach to it (including a comprehensive discussion of the known risks, side effects and potential benefits of treatment), the patient (family) agrees to implement the following specific plan:    Apply topically to entire body 3 times a day for sun protection              3  NEOPLASM OF UNCERTAIN BEHAVIOR OF SKIN    Physical Exam:   (Anatomic Location); (Size and Morphological Description); (Differential Diagnosis):  o Left helix  0 4 cm x 0 4 cm pink firm papule with central white core: differential diagnosis: Chondrodermatitis nodularis helices versus squamous cell carcinoma      Additional History of Present Condition:  Patient states bump has been there for years with no changes  Assessment and Plan:   I have discussed with the patient that a sample of skin via a "skin biopsy would be potentially helpful to further make a specific diagnosis under the microscope   Based on a thorough discussion of this condition and the management approach to it (including a comprehensive discussion of the known risks, side effects and potential benefits of treatment), the patient (family) agrees to implement the following specific plan:    o Procedure:  Skin Biopsy  After a thorough discussion of treatment options and risk/benefits/alternatives (including but not limited to local pain, scarring, dyspigmentation, blistering, possible superinfection, and inability to confirm a diagnosis via histopathology), verbal and written consent were obtained and portion of the rash was biopsied for tissue sample  See below for consent that was obtained from patient and subsequent Procedure Note  PROCEDURE SHAVE BIOPSY NOTE:     Performing Physician: Devyn Dean    Anatomic Location; Clinical Description with size (cm); Pre-Op Diagnosis:   o A) Left helix  0 4 cm x 0 4 cm pink firm papule with central white core: differential diagnosis: Chondrodermatitis nodularis helices versus squamous cell carcinoma     Post-op diagnosis: Same      Local anesthesia: 1% xylocaine with epi       Topical anesthesia: None     Hemostasis: Aluminum chloride       After obtaining informed consent  at which time there was a discussion about the purpose of biopsy  and low risks of infection and bleeding  The area was prepped and draped in the usual fashion  Anesthesia was obtained with 1% lidocaine with epinephrine   A shave biopsy to an appropriate sampling depth was obtained with a sterile blade (such as a 15-blade or Marko)  The resulting wound was covered with surgical ointment and bandaged appropriately  The patient tolerated the procedure well without complications and was without signs of functional compromise  Specimen has been sent for review by Dermatopathology  Standard post-procedure care has been explained and has been included in written form within the patient's copy of Informed Consent  INFORMED CONSENT DISCUSSION AND POST-OPERATIVE INSTRUCTIONS FOR PATIENT    I   RATIONALE FOR PROCEDURE  I understand that a skin biopsy allows the Dermatologist to test a lesion or rash under the microscope to obtain a diagnosis  It usually involves numbing the area with numbing medication and removing a small piece of skin; sometimes the area will be closed with sutures  In this specific procedure, sutures are not usually needed  If any sutures are placed, then they are usually need to be removed in 2 weeks or less  I understand that my Dermatologist recommends that a skin "shave" biopsy be performed today  A local anesthetic, similar to the kind that a dentist uses when filling a cavity, will be injected with a very small needle into the skin area to be sampled  The injected skin and tissue underneath "will go to sleep and become numb so no pain should be felt afterwards  An instrument shaped like a tiny "razor blade" (shave biopsy instrument) will be used to cut a small piece of tissue and skin from the area so that a sample of tissue can be taken and examined more closely under the microscope  A slight amount of bleeding will occur, but it will be stopped with direct pressure and a pressure bandage and any other appropriate methods  I understands that a scar will form where the wound was created  Surgical ointment will be applied to help protect the wound  Sutures are not usually needed      II   RISKS AND POTENTIAL COMPLICATIONS   I understand the risks and potential complications of a skin biopsy include but are not limited to the following:   Bleeding   Infection   Pain   Scar/keloid   Skin discoloration   Incomplete Removal   Recurrence   Nerve Damage/Numbness/Loss of Function   Allergic Reaction to Anesthesia   Biopsies are diagnostic procedures and based on findings additional treatment or evaluation may be required   Loss or destruction of specimen resulting in no additional findings    My Dermatologist has explained to me the nature of the condition, the nature of the procedure, and the benefits to be reasonably expected compared with alternative approaches  My Dermatologist has discussed the likelihood of major risks or complications of this procedure including the specific risks listed above, such as bleeding, infection, and scarring/keloid  I understand that a scar is expected after this procedure  I understand that my physician cannot predict if the scar will form a "keloid," which extends beyond the borders of the wound that is created  A keloid is a thick, painful, and bumpy scar  A keloid can be difficult to treat, as it does not always respond well to therapy, which includes injecting cortisone directly into the keloid every few weeks  While this usually reduces the pain and size of the scar, it does not eliminate it  I understand that photographs may be taken before and after the procedure  These will be maintained as part of the medical providers confidential records and may not be made available to me  I further authorize the medical provider to use the photographs for teaching purposes or to illustrate scientific papers, books, or lectures if in his/her judgment, medical research, education, or science may benefit from its use  I have had an opportunity to fully inquire about the risks and benefits of this procedure and its alternatives  I have been given ample time and opportunity to ask questions and to seek a second opinion if I wished to do so    I acknowledge that there have specifically been no guarantees as to the cosmetic results from the procedure  I am aware that with any procedure there is always the possibility of an unexpected complication  III  POST-PROCEDURAL CARE (WHAT YOU WILL NEED TO DO "AFTER THE BIOPSY" TO OPTIMIZE HEALING)     Keep the area clean and dry  Try NOT to remove the bandage or get it wet for the first 24 hours   Gently clean the area and apply surgical ointment (such as Vaseline petrolatum ointment, which is available "over the counter" and not a prescription) to the biopsy site for up to 2 weeks straight  This acts to protect the wound from the outside world   Sutures are not usually placed in this procedure  If any sutures were placed, return for suture removal as instructed (generally 1 week for the face, 2 weeks for the body)   Take Acetaminophen (Tylenol) for discomfort, if no contraindications  Ibuprofen or aspirin could make bleeding worse   Call our office immediately for signs of infection: fever, chills, increased redness, warmth, tenderness, discomfort/pain, or pus or foul smell coming from the wound  WHAT TO DO IF THERE IS ANY BLEEDING? If a small amount of bleeding is noticed, place a clean cloth over the area and apply firm pressure for ten minutes  Check the wound after 10 minutes of direct pressure  If bleeding persists, try one more time for an additional 10 minutes of direct pressure on the area  If the bleeding becomes heavier or does not stop after the second attempt, or if you have any other questions about this procedure, then please call your [facility] Dermatologist by calling [phone] (SKIN)  I hereby acknowledge that I have reviewed and verified the site with my Dermatologist and have requested and authorized my Dermatologist to proceed with the procedure        4 SEBORRHEIC DERMATITIS    Physical Exam:   Anatomic Location Affected:  face   Morphological Description:  Red scaly plaques     Additional History of Present Condition:  Patient states he has tried Lotrisone cream with no improvement  Assessment and Plan:  Based on a thorough discussion of this condition and the management approach to it (including a comprehensive discussion of the known risks, side effects and potential benefits of treatment), the patient (family) agrees to implement the following specific plan:   Ketoconazole shampoo apply topically to face and  Lather daily; leave on for 5 mintues and rinse off   When under control, continue 1-2x/week             After biopsies, patient stated he felt weak and was going to pass out  Patient did lose color in face and became diaphoretic  Patient was then put in a supine position and with knees up  As patient was lying down patient became unresponsive with eyes rolling back for 10 seconds  Patient then came to and tried to sit up, face red and jerking movement of the arms  Patient then fully became responsive  Patient blood pressure, pulse, blood sugar was checked and noted in chart  Patient was then given a juice box and some crackers; during this time patient then again stated he felt like he was going to pass out  Patient then again lost color in face, eyes rolled back and had jerking movement for 5 seconds  Patient blood pressure was checked again  Ambulance was called and took patient to [facility], medication list and face sheet was given to the paramedics       - both lidocaine and epinephrine are added as allergies to his medication list, just in case  - wife, Manuel Rivera, was fully updated by phone by Dr Brad Garza,:   Eitan Gaston am acting as a scribe while in the presence of the attending physician :        I,:   Fco Burciaga MD personally performed the services described in this documentation    as scribed in my presence :

## 2019-12-30 NOTE — ED ATTENDING ATTESTATION
Lester Almaguer MD, saw and evaluated the patient  All available labs and X-rays were ordered by me or the resident and have been reviewed by myself  I discussed the patient with the resident / non-physician and agree with the resident's / non-physician practitioner's findings and plan as documented in the resident's / non-physician practicitioner's note, except where noted  At this point, I agree with the current assessment done in the ED  I was present during key portions of all procedures performed unless otherwise stated  Chief Complaint   Patient presents with    Syncope     syncope x 2 after getting local anesthesia at a dermatology appointment  Pt didn't eat prior to appointment  This is a 61-year-old male presenting for evaluation of likely vasovagal episode  The patient does have a history of diabetes, is not on insulin, did just metformin  Today he did not have breakfast but he was going to the dermatologist   He has been having this erythematous rash located on his face and upper chest   No real cause for it  While the dermatitis office, they injected lidocaine with the plan for punch biopsy  He had a punch biopsy done and immediately after started feel very lightheaded, described as woozy  He then syncopized and then had a little bit of jerking motion without any tongue biting  He woke up was perfectly normal while I was on the ground  When he sat up he had a near syncopal episode a 2nd time  Because the 2nd episode the patient was sent into the emergency room for further evaluation  No fevers chills nausea vomiting chest pain shortness of breath palpitations  Feels completely back to normal at this time  No urinary symptoms including bowel or bladder incontinence, burning itching pain blood frequency    PE:  Vitals:    12/30/19 1311 12/30/19 1316 12/30/19 1518   BP: 134/81  158/77   BP Location: Right arm     Pulse: 69  68   Resp: 20  18   Temp: 97 7 °F (36 5 °C) TempSrc: Oral     SpO2: 99%  99%   Weight: 87 5 kg (193 lb) 87 5 kg (192 lb 14 4 oz)    Height: 6' 2" (1 88 m)     General: VSS, NAD, awake, alert  Well-nourished, well-developed  Appears stated age  Speaking normally in full sentences  Head: Normocephalic, atraumatic, nontender  Eyes: PERRL, EOM-I  No diplopia  No hyphema  No subconjunctival hemorrhages  Symmetrical lids  ENT: Atraumatic external nose and ears  MMM  No malocclusion  No stridor  Normal phonation  No drooling  Normal swallowing  Neck: Symmetric, trachea midline  No JVD  CV: RRR  +S1/S2  No murmurs or gallops  Peripheral pulses +2 throughout  No chest wall tenderness  Lungs:   Unlabored No retractions  CTAB, lungs sounds equal bilateral    No tachypnea  Abd: +BS, soft, NT/ND    MSK:   FROM   Back:   Nonspecific redness on the face  Skin: Dry, intact  Neuro: AAOx3, GCS 15, CN II-XII grossly intact  Motor grossly intact  Psychiatric/Behavioral: Appropriate mood and affect   Exam: deferred  A:  - Syncope  P:  - Patient is not high-risk for syncope as the patient has no high risk criteria including: CHF hx, hematocrit<30%, Abnormal EKG (new EKG changes from any source, any non-sinus rhythm or monitoring), SOB symptoms, systolic BP < 90 at triage  So, patient IS in the low-risk group for serious outcome  - cardaic already ordered  - ambulate / DC  - 13 point ROS was performed and all are normal unless stated in the history above  - Nursing note reviewed  Vitals reviewed  - Orders placed by myself and/or advanced practitioner / resident     - Previous chart was reviewed  - No language barrier    - History obtained from patient  - There are no limitations to the history obtained  - Critical care time: Not applicable for this patient  Code Status: No Order  Advance Directive and Living Will:      Power of :    POLST:      Final Diagnosis:  1   Vasovagal syncope        ED Course as of Dec 31 0839   Mon Dec 30, 2019   1351 WBC: 8 67   1351 Hemoglobin: 14 4   1351 Platelet Count: 270   4287 Going to CXR now  Tolerating food  Medications   sodium chloride 0 9 % bolus 1,000 mL (0 mL Intravenous Stopped 12/30/19 1515)     XR chest 2 views   Final Result      No acute cardiopulmonary disease  Workstation performed: [id number]           Orders Placed This Encounter   Procedures    ED ECG Documentation Only    XR chest 2 views    CBC and differential    Comprehensive metabolic panel    Troponin I    Continuous cardiac monitoring    Continuous pulse oximetry    EKG RESULTS    ECG 12 lead    ECG 12 lead     Labs Reviewed   COMPREHENSIVE METABOLIC PANEL - Abnormal       Result Value Ref Range Status    Sodium 139  136 - 145 mmol/L Final    Potassium 4 5  3 5 - 5 3 mmol/L Final    Comment: Slightly Hemolyzed; Results May be Affected    Chloride 105  100 - 108 mmol/L Final    CO2 26  21 - 32 mmol/L Final    ANION GAP 8  4 - 13 mmol/L Final    BUN 16  5 - 25 mg/dL Final    Creatinine 0 99  0 60 - 1 30 mg/dL Final    Comment: Standardized to IDMS reference method    Glucose 213 (*) 65 - 140 mg/dL Final    Comment:   If the patient is fasting, the ADA then defines impaired fasting glucose as > 100 mg/dL and diabetes as > or equal to 123 mg/dL  Specimen collection should occur prior to Sulfasalazine administration due to the potential for falsely depressed results  Specimen collection should occur prior to Sulfapyridine administration due to the potential for falsely elevated results  Calcium 9 9  8 3 - 10 1 mg/dL Final    AST 24  5 - 45 U/L Final    Comment: Slightly Hemolyzed; Results May be Affected  Specimen collection should occur prior to Sulfasalazine administration due to the potential for falsely depressed results       ALT 30  12 - 78 U/L Final    Comment:   Specimen collection should occur prior to Sulfasalazine and/or Sulfapyridine administration due to the potential for falsely depressed results  Alkaline Phosphatase 88  46 - 116 U/L Final    Total Protein 8 1  6 4 - 8 2 g/dL Final    Albumin 3 9  3 5 - 5 0 g/dL Final    Total Bilirubin 0 41  0 20 - 1 00 mg/dL Final    eGFR 84  ml/min/1 73sq m Final    Narrative:     [place] guidelines for Chronic Kidney Disease (CKD):     Stage 1 with normal or high GFR (GFR > 90 mL/min/1 73 square meters)    Stage 2 Mild CKD (GFR = 60-89 mL/min/1 73 square meters)    Stage 3A Moderate CKD (GFR = 45-59 mL/min/1 73 square meters)    Stage 3B Moderate CKD (GFR = 30-44 mL/min/1 73 square meters)    Stage 4 Severe CKD (GFR = 15-29 mL/min/1 73 square meters)    Stage 5 End Stage CKD (GFR <15 mL/min/1 73 square meters)  Note: GFR calculation is accurate only with a steady state creatinine   TROPONIN I - Normal    Troponin I <0 02  <=0 04 ng/mL Final    Comment:   [employer] Chemistry analyzer 99% cutoff is > 0 04 ng/mL in network labs     o cTnI 99% cutoff is useful only when applied to patients in the clinical setting of myocardial ischemia   o cTnI 99% cutoff should be interpreted in the context of clinical history, ECG findings and possibly cardiac imaging to establish correct diagnosis  o cTnI 99% cutoff may be suggestive but clearly not indicative of a coronary event without the clinical setting of myocardial ischemia       CBC AND DIFFERENTIAL    WBC 8 67  4 31 - 10 16 Thousand/uL Final    RBC 4 78  3 88 - 5 62 Million/uL Final    Hemoglobin 14 4  12 0 - 17 0 g/dL Final    Hematocrit 42 8  36 5 - 49 3 % Final    MCV 90  82 - 98 fL Final    MCH 30 1  26 8 - 34 3 pg Final    MCHC 33 6  31 4 - 37 4 g/dL Final    RDW 12 6  11 6 - 15 1 % Final    MPV 9 7  8 9 - 12 7 fL Final    Platelets 703  706 - 390 Thousands/uL Final    nRBC 0  /100 WBCs Final    Neutrophils Relative 56  43 - 75 % Final    Immat GRANS % 0  0 - 2 % Final    Lymphocytes Relative 34  14 - 44 % Final    Monocytes Relative 6  4 - 12 % Final    Eosinophils Relative 3  0 - 6 % Final    Basophils Relative 1  0 - 1 % Final    Neutrophils Absolute 4 85  1 85 - 7 62 Thousands/µL Final    Immature Grans Absolute 0 02  0 00 - 0 20 Thousand/uL Final    Lymphocytes Absolute 2 92  0 60 - 4 47 Thousands/µL Final    Monocytes Absolute 0 54  0 17 - 1 22 Thousand/µL Final    Eosinophils Absolute 0 28  0 00 - 0 61 Thousand/µL Final    Basophils Absolute 0 06  0 00 - 0 10 Thousands/µL Final    Narrative: This is an appended report  These results have been appended to a previously verified report  Time reflects when diagnosis was documented in both MDM as applicable and the Disposition within this note     Time User Action Codes Description Comment    12/30/2019  3:15 PM Cahntal Dimas Add [R55] Vasovagal syncope       ED Disposition     ED Disposition Condition Date/Time Comment    Discharge Good Mon Dec 30, 2019  3:15 PM Agustina Esters Quick discharge to home/self care  Follow-up Information     Follow up With Specialties Details Why Contact Info Additional Information    Tabatha Renee MD Family Medicine Call in 1 day Please follow up with your PCP within 1 week  [address]  [facility] [place] [address]  [phone]       [address] [place] [address] Emergency Department Emergency Medicine Go to  If symptoms worsen   [address]  [phone]  ED, [address], [address]   [phone]        Discharge Medication List as of 12/30/2019  3:16 PM      START taking these medications    Details   clobetasol (TEMOVATE) 0 05 % ointment Apply topically twice a day to affected areas, Normal      ketoconazole (NIZORAL) 2 % shampoo Apply topically to face daily and lather; leave in for 5 minutes and rinse off, Normal         CONTINUE these medications which have NOT CHANGED    Details   ACCU-CHEK FASTCLIX LANCETS MISC Use 1 lancet a day to check blood sugar, E11 65, Normal      aspirin (ECOTRIN LOW STRENGTH) 81 mg EC tablet Take 81 mg by mouth daily, Historical Med      calcium carbonate (OS-EDIE) 1250 (500 Ca) MG chewable tablet Chew 2 tablets 2 (two) times a day as needed  , Historical Med      cyanocobalamin (VITAMIN B-12) 100 mcg tablet Take 1,000 mcg by mouth daily, Historical Med      Flaxseed, Linseed, (FLAXSEED OIL) 1000 MG CAPS Take by mouth, Historical Med      glucose blood (ACCU-CHEK GUIDE) test strip Use 1 test strip a day to check blood sugar, E11 65, Normal      clotrimazole-betamethasone (LOTRISONE) 1-0 05 % cream Apply to affected area 2 times daily, Print      metFORMIN (GLUCOPHAGE) 1000 MG tablet Take 1 tablet (1,000 mg total) by mouth 2 (two) times a day, Starting Wed 10/30/2019, Normal           No discharge procedures on file  Prior to Admission Medications   Prescriptions Last Dose Informant Patient Reported? Taking?    ACCU-CHEK FASTCLIX LANCETS MISC   No No   Sig: Use 1 lancet a day to check blood sugar, E11 65   Flaxseed, Linseed, (FLAXSEED OIL) 1000 MG CAPS   Yes No   Sig: Take by mouth   aspirin (ECOTRIN LOW STRENGTH) 81 mg EC tablet  Self Yes No   Sig: Take 81 mg by mouth daily   calcium carbonate (OS-EDIE) 1250 (500 Ca) MG chewable tablet  Self Yes No   Sig: Chew 2 tablets 2 (two) times a day as needed     clobetasol (TEMOVATE) 0 05 % ointment   No No   Sig: Apply topically twice a day to affected areas   clotrimazole-betamethasone (LOTRISONE) 1-0 05 % cream   No No   Sig: Apply to affected area 2 times daily   Patient not taking: Reported on 12/23/2019   cyanocobalamin (VITAMIN B-12) 100 mcg tablet   Yes No   Sig: Take 1,000 mcg by mouth daily   glucose blood (ACCU-CHEK GUIDE) test strip   No No   Sig: Use 1 test strip a day to check blood sugar, E11 65   ketoconazole (NIZORAL) 2 % shampoo   No No   Sig: Apply topically to face daily and lather; leave in for 5 minutes and rinse off   metFORMIN (GLUCOPHAGE) 1000 MG tablet   No No   Sig: Take 1 tablet (1,000 mg total) by mouth 2 (two) times a day   Patient not taking: Reported on 12/23/2019      Facility-Administered Medications: None       Portions of the record may have been created with voice recognition software  Occasional wrong word or "sound a like" substitutions may have occurred due to the inherent limitations of voice recognition software  Read the chart carefully and recognize, using context, where substitutions have occurred      Electronically signed by:  Christophe Vega

## 2019-12-31 ENCOUNTER — TELEPHONE (OUTPATIENT)
Dept: DERMATOLOGY | Facility: CLINIC | Age: 59
End: 2019-12-31

## 2019-12-31 DIAGNOSIS — R21 RASH: Primary | ICD-10-CM

## 2019-12-31 NOTE — TELEPHONE ENCOUNTER
Called and spoke to patient  Doing well  Was discharged home from ER yesterday and feeling better  Picked up ketoconazole from pharmacy  Clobetasol was $50 and asking for alternative  Sent prescription for triamcinolone, large quantity  He will see how much the copay is and contact us if it is too much

## 2020-01-09 DIAGNOSIS — L21.9 SEBORRHEIC DERMATITIS: Primary | ICD-10-CM

## 2020-01-09 RX ORDER — KETOCONAZOLE 20 MG/G
CREAM TOPICAL DAILY
Qty: 60 G | Refills: 5 | Status: SHIPPED | OUTPATIENT
Start: 2020-01-09 | End: 2020-05-04 | Stop reason: SDUPTHER

## 2020-01-09 RX ORDER — KETOCONAZOLE 20 MG/G
CREAM TOPICAL DAILY
Qty: 60 G | Refills: 5 | Status: SHIPPED | OUTPATIENT
Start: 2020-01-09 | End: 2020-01-09 | Stop reason: SDUPTHER

## 2020-01-09 NOTE — TELEPHONE ENCOUNTER
Patient called  Ketoconazole shampoo is burning face and leaving it dry  Would like to try cream- sent prescription to pharmacy  Biopsy results not back yet  Arms much improved

## 2020-01-10 ENCOUNTER — TELEPHONE (OUTPATIENT)
Dept: DERMATOLOGY | Facility: CLINIC | Age: 60
End: 2020-01-10

## 2020-01-10 NOTE — RESULT ENCOUNTER NOTE
Called patient with results  Did not answer  Will try again later    - plan to change clobetasol to triamcinolone  - follow up on ketoconazole cream  - determine follow up appointment

## 2020-01-11 NOTE — RESULT ENCOUNTER NOTE
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 in the appropriate clinical context  Multiple levels examined      B  Skin, right forearm, punch biopsy:     Spongiotic dermatitis with focal excoriation and superficial to mid-dermal perivascular lymphocytic infiltrate with scattered eosinophils (see note)       Note: Pathogenic microorganisms are not seen on PAS stain  The histologic findings are consistent with an ECZEMATOUS DERMATITIS, including allergic contact dermatitis, nummular dermatitis, and atopic dermatitis  A hypersensitivity reaction (e g, to a drug, contactant) is in the histologic differential  Definite histologic evidence of psoriasis, sarcoidosis, necrobiosis lipoidica, and necrobiotic xanthogranuloma is not appreciated  Multiple levels examined  Clinical pathological correlation is advised  Tejal [place] of [place]/Condition:  BENIGN     Provider has personally called patient and relayed results and plan:  yes    Plan:  Spoke with patient  Reviewed biopsy results  A) left helix- benign, reassured  B) Recommended to return to clinic in 2 weeks if not improving  Started ketoconazole cream yesterday  Face is still inflamed  Can use triamcinolone ointment on face 1x/day, for 3-5 days  Of note, he should have triamcinolone ointment        Case Report  Surgical Pathology Report                         Case: [id number]                                   Authorizing Provider: Rashad Ramos MD            Collected:           12/30/2019 [id number]              Ordering Location:     [facility]      Received:            12/30/2019 7096                                   [facility]                                                                Pathologist:           Yasir Albarran MD                                                           Specimens:   A) - Skin, Other, A) Left helix                                                                     B) - Skin, Other, B) right forearm                                                       Final Diagnosis  A  Skin, left helix, shave biopsy:     Epidermal hyperplasia overlying dermal fibrosis (see note)      Note: 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 in the appropriate clinical context  Multiple levels examined      B  Skin, right forearm, punch biopsy:     Spongiotic dermatitis with focal excoriation and superficial to mid-dermal perivascular lymphocytic infiltrate with scattered eosinophils (see note)       Note: Pathogenic microorganisms are not seen on PAS stain  The histologic findings are consistent with an ECZEMATOUS DERMATITIS, including allergic contact dermatitis, nummular dermatitis, and atopic dermatitis  A hypersensitivity reaction (e g, to a drug, contactant) is in the histologic differential  Definite histologic evidence of psoriasis, sarcoidosis, necrobiosis lipoidica, and necrobiotic xanthogranuloma is not appreciated  Multiple levels examined  Clinical pathological correlation is advised            Electronically signed by Yasir Albarran MD on 1/9/2020 at  1:04 PM  Additional Information   All controls performed with the immunohistochemical stains reported above reacted appropriately  These tests were developed and their performance characteristics determined by [facility] [address] [place] [address] or [address] [place] [address]   They may not be cleared or approved by the [place]  Food and Drug Administration  The FDA has determined that such clearance or approval is not necessary  These tests are used for clinical purposes  They should not be regarded as investigational or for research  This laboratory has been approved by [place] [id number], designated as a high-complexity laboratory and is qualified to perform these tests  Gross Description   A  The specimen is received in formalin, labeled with the patient's name and hospital number, and is designated "left helix"  The specimen consists of a pink-white, bulging, centrally crusted, focally dense superficial portion of skin measuring 0 5 x 0 5 x 0 2 cm in greatest dimension  The margin of resection is inked green  The specimen is bisected and is entirely submitted between sponges in 1 cassette      B  The specimen is received in formalin, labeled with the patient's name and hospital number, and is designated "right forearm"  The specimen consists of a single tan-brown, mottled, focally hemorrhagic, focally crusted, rubbery, punch-shaped portion of skin measuring 0 4 cm in diameter with attached underlying soft tissue to a depth of 0 5 cm  The margin of resection is inked green and the epithelial surface is inked red  The specimen is bisected and is entirely submitted between sponges in 1 cassette      Note: The estimated total formalin fixation time based upon information provided by the submitting clinician and the standard processing schedule is under 72 hours         AGealena  Clinical Information   Specimen A:Left helix, skin, shave biopsy; 64year old with a 0 4 cm x 0 4 cm pink firm papule with central white core: differential diagnosis: Chondrodermatitis nodularis helices versus squamous cell carcinoma     Specimen B:Right forearm;skin, punch biopsy, 64year old male with a Pink plaques with punctate red papules within, slight yellow hue, Differential diagnosis Necrobiosis lipoidica, versus psoriasis versus sarcoidosis versus necrobiotic xanthogranuloma

## 2020-01-11 NOTE — TELEPHONE ENCOUNTER
Spoke with patient  Reviewed biopsy results  Recommended to return to clinic in 2 weeks if not improving  Can use triamcinolone ointment on face 1x/day, for 3-5 days

## 2020-02-19 DIAGNOSIS — E11.9 TYPE 2 DIABETES MELLITUS WITHOUT COMPLICATION, WITHOUT LONG-TERM CURRENT USE OF INSULIN (HCC): ICD-10-CM

## 2020-02-20 ENCOUNTER — TELEPHONE (OUTPATIENT)
Dept: ENDOCRINOLOGY | Facility: CLINIC | Age: 60
End: 2020-02-20

## 2020-02-20 NOTE — TELEPHONE ENCOUNTER
Please advised patient that he will need to contact Endocrin since they are now following his diabetes

## 2020-02-20 NOTE — TELEPHONE ENCOUNTER
Dr Michael Serrano,    Can you please refill for Dr Bárbara Min   This was sent to her yesterday and patient only has 4 pills left and takes 2 per day  Cannot wait until Dr Bárbara Min returns  Thank you

## 2020-02-21 DIAGNOSIS — E11.9 TYPE 2 DIABETES MELLITUS WITHOUT COMPLICATION, WITHOUT LONG-TERM CURRENT USE OF INSULIN (HCC): ICD-10-CM

## 2020-02-21 NOTE — TELEPHONE ENCOUNTER
I thought so  He had a change of heart about the medication  I'll call him back and set up an appointment

## 2020-02-21 NOTE — TELEPHONE ENCOUNTER
He is trying to avoid doctor and hospital bills  He will call me back when he feels he can come for an appointment

## 2020-02-26 DIAGNOSIS — E11.9 TYPE 2 DIABETES MELLITUS WITHOUT COMPLICATION, WITHOUT LONG-TERM CURRENT USE OF INSULIN (HCC): Primary | ICD-10-CM

## 2020-02-26 NOTE — TELEPHONE ENCOUNTER
Patient called this morning  He would like a small prescription of metformin to get him through until his appointment here on Monday, 3/2/20  He said it is really affecting him not taking the metformin  He is tired and miserable

## 2020-02-26 NOTE — TELEPHONE ENCOUNTER
Please let patient know I prescribed metformin 500mg twice a day  Bring blood sugar log to appointment so we can review his numbers  Nilda ROBLEDO    Endocrinology

## 2020-03-02 ENCOUNTER — OFFICE VISIT (OUTPATIENT)
Dept: ENDOCRINOLOGY | Facility: CLINIC | Age: 60
End: 2020-03-02
Payer: COMMERCIAL

## 2020-03-02 VITALS
SYSTOLIC BLOOD PRESSURE: 130 MMHG | WEIGHT: 194 LBS | HEART RATE: 96 BPM | DIASTOLIC BLOOD PRESSURE: 70 MMHG | HEIGHT: 74 IN | BODY MASS INDEX: 24.9 KG/M2

## 2020-03-02 DIAGNOSIS — E11.65 TYPE 2 DIABETES MELLITUS WITH HYPERGLYCEMIA, WITHOUT LONG-TERM CURRENT USE OF INSULIN (HCC): Primary | ICD-10-CM

## 2020-03-02 DIAGNOSIS — E78.00 PURE HYPERCHOLESTEROLEMIA: ICD-10-CM

## 2020-03-02 DIAGNOSIS — I10 HYPERTENSION GOAL BP (BLOOD PRESSURE) < 140/90: ICD-10-CM

## 2020-03-02 PROCEDURE — 99214 OFFICE O/P EST MOD 30 MIN: CPT | Performed by: INTERNAL MEDICINE

## 2020-03-02 PROCEDURE — 3075F SYST BP GE 130 - 139MM HG: CPT | Performed by: INTERNAL MEDICINE

## 2020-03-02 PROCEDURE — 3078F DIAST BP <80 MM HG: CPT | Performed by: INTERNAL MEDICINE

## 2020-03-02 PROCEDURE — 1036F TOBACCO NON-USER: CPT | Performed by: INTERNAL MEDICINE

## 2020-03-02 PROCEDURE — 3008F BODY MASS INDEX DOCD: CPT | Performed by: INTERNAL MEDICINE

## 2020-03-02 RX ORDER — ATORVASTATIN CALCIUM 20 MG/1
20 TABLET, FILM COATED ORAL DAILY
Qty: 90 TABLET | Refills: 3 | Status: SHIPPED | OUTPATIENT
Start: 2020-03-02 | End: 2020-03-20 | Stop reason: SDUPTHER

## 2020-03-02 RX ORDER — GLIMEPIRIDE 2 MG/1
2 TABLET ORAL
Qty: 90 TABLET | Refills: 3 | Status: SHIPPED | OUTPATIENT
Start: 2020-03-02 | End: 2020-03-20 | Stop reason: SDUPTHER

## 2020-03-02 NOTE — PATIENT INSTRUCTIONS
Increase metformin to 1000mg twice a day    Start amaryl (glimepiride) pill once a day 2mg    Start cholesterol pill atorvastatin 20mg once a day     Follow up in 2 weeks

## 2020-03-02 NOTE — PROGRESS NOTES
ENDOCRINOLOGY  FOLLOW UP VISIT      Reason for Endocrine Consult/Chief Complaint: DM management       ? Medical Decision Making:     Impression  1  DM2  2  HLD  3  HTN  4  Rash- saw dermatology on topical steroids         Recommendations:  ?  BGs are quite high in 200-300s with occasional 400s  He does not want to go on insulin despite me counseling that basal insulin would likely be necessary  He understands the risks of not being on insulin including risk of DKA and HHS and risk of future hospitalization  Instructed to go to hospital if he develops nausea,vomiting, abdominal pain  For now will increase metformin to 1000mg BID AC and start glimepiride 2mg qday to improve BGs  Will see him back in 2 weeks for review of improvement  If BGs still high will have to discuss basal insulin again with him        Hyperlipidemia-LDL elevated 167 Dec 2019, stared atorvastatin 20mg qday, he states he is not allergic to atorvastatin and wants to start therapy     HTN- BP controlled today, continue to monitor off therapy     Rash-saw dermatology on topical steroid      RTC in 2 weeks    Dulce ROBLEDO            History of Present Illness:    Mr Ryan Franco is a 53-year-old male who presents for diabetes management   fasting this morning  ?  PMH-diabetes, hyperlipidemia  PSH-fracture surgery  FHx-mother with diabetes and sister with diabetes and brother with diabetes  SHx-works at [employer], neg x 3      Type of DM:  2  Age of onset: 10 years   Microvascular complications:  None known  Macrovascular complications:  None known      Events since last visit:     On metformin 500mg twice a day   FBG- 200-300s  Premeal/qHS BG- 300-400s  Hypoglycemia- none     Having some increase urinary frequency   ? Review of Systems:     Review of Systems   Constitutional: Negative for appetite change, chills, diaphoresis, fatigue, fever and unexpected weight change     HENT: Negative for congestion, ear pain, hearing loss, rhinorrhea, sinus pressure, sinus pain, sore throat, trouble swallowing and voice change  Eyes: Negative for photophobia, redness and visual disturbance  Respiratory: Negative for apnea, cough, chest tightness, shortness of breath, wheezing and stridor  Cardiovascular: Negative for chest pain, palpitations and leg swelling  Gastrointestinal: Negative for abdominal distention, abdominal pain, constipation, diarrhea, nausea and vomiting  Endocrine: Negative for cold intolerance, heat intolerance, polydipsia, polyphagia and polyuria  Genitourinary: +increased urinary frequency  Musculoskeletal: Negative for arthralgias, back pain, gait problem, joint swelling and myalgias  Skin: Negative for color change, pallor, rash and wound  Allergic/Immunologic: Negative for immunocompromised state  Neurological: Negative for dizziness, tremors, syncope, weakness, light-headedness and headaches  Hematological: Negative for adenopathy  Does not bruise/bleed easily  Psychiatric/Behavioral: Negative for confusion and sleep disturbance  The patient is not nervous/anxious  ?   Patient History:     Past Medical History:   Diagnosis Date    [facility]      Past Surgical History:   Procedure Laterality Date    FRACTURE SURGERY       Social History     Socioeconomic History    Marital status: /Civil Union     Spouse name: Not on file    Number of children: Not on file    Years of education: Not on file    Highest education level: Not on file   Occupational History    Not on file   Social Needs    Financial resource strain: Not on file    Food insecurity:     Worry: Not on file     Inability: Not on file    Transportation needs:     Medical: Not on file     Non-medical: Not on file   Tobacco Use    Smoking status: Former Smoker     Types: Cigarettes    Smokeless tobacco: Never Used   Substance and Sexual Activity    Alcohol use: No     Comment: 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)    Drug use: No    Sexual activity: Not on file   Lifestyle    Physical activity:     Days per week: Not on file     Minutes per session: Not on file    Stress: Not on file   Relationships    Social connections:     Talks on phone: Not on file     Gets together: Not on file     Attends Jew service: Not on file     Active member of club or organization: Not on file     Attends meetings of clubs or organizations: Not on file     Relationship status: Not on file    Intimate partner violence:     Fear of current or ex partner: Not on file     Emotionally abused: Not on file     Physically abused: Not on file     Forced sexual activity: Not on file   Other Topics Concern    Not on file   Social History Narrative    Not on file     Family History   Problem Relation Age of Onset    Diabetes Mother     Diabetes Sister     Diabetes Brother     Pancreatic cancer Sister     Aneurysm Sister         vertebral artery aneurysm       Current Medications: At the time this note was written these were the medications the patient was on    Current Outpatient Medications   Medication Sig Dispense Refill    ACCU-CHEK FASTCLIX LANCETS MISC Use 1 lancet a day to check blood sugar, E11 65 100 each 3    aspirin (ECOTRIN LOW STRENGTH) 81 mg EC tablet Take 81 mg by mouth daily      calcium carbonate (OS-EDIE) 1250 (500 Ca) MG chewable tablet Chew 2 tablets 2 (two) times a day as needed        cyanocobalamin (VITAMIN B-12) 100 mcg tablet Take 1,000 mcg by mouth daily      Flaxseed, Linseed, (FLAXSEED OIL) 1000 MG CAPS Take by mouth      glucose blood (ACCU-CHEK GUIDE) test strip Use 1 test strip a day to check blood sugar, E11 65 100 each 3    ketoconazole (NIZORAL) 2 % cream Apply topically daily To face 60 g 5    metFORMIN (GLUCOPHAGE) 500 mg tablet Take 1 tablet (500 mg total) by mouth 2 (two) times a day with meals 90 tablet 0    triamcinolone (KENALOG) 0 1 % ointment Apply topically 2 (two) times a day To rash 454 g 0     No current facility-administered medications for this visit  Allergies: Delsym [dextromethorphan]; Epinephrine; Lidocaine; and Atorvastatin    Physical Exam:   Vital Signs:   /70   Pulse 96   Ht 6' 2" (1 88 m)   Wt 88 kg (194 lb)   BMI 24 91 kg/m²     Physical Exam   Constitutional: He is oriented to person, place, and time  He appears well-developed and well-nourished  HENT:   Head: Normocephalic and atraumatic  Nose: Nose normal    Mouth/Throat: Oropharynx is clear and moist  No oropharyngeal exudate  Eyes: Pupils are equal, round, and reactive to light  Conjunctivae and EOM are normal  No scleral icterus  Neck: Normal range of motion  Neck supple  No thyromegaly present  Cardiovascular: Normal rate, regular rhythm and normal heart sounds  Exam reveals no gallop and no friction rub  No murmur heard  Pulmonary/Chest: Effort normal and breath sounds normal  No stridor  No respiratory distress  He has no wheezes  He has no rales  Abdominal: Soft  Bowel sounds are normal  He exhibits no distension and no mass  There is no tenderness  There is no rebound and no guarding  Musculoskeletal: Normal range of motion  He exhibits no edema  Lymphadenopathy:     He has no cervical adenopathy  Neurological: He is alert and oriented to person, place, and time  Skin: Skin is warm and dry  No rash noted  No erythema  No pallor  Psychiatric: He has a normal mood and affect   His behavior is normal  Judgment and thought content normal         Labs and Imaging:    No recent labs

## 2020-03-05 DIAGNOSIS — E11.65 TYPE 2 DIABETES MELLITUS WITH HYPERGLYCEMIA, WITHOUT LONG-TERM CURRENT USE OF INSULIN (HCC): ICD-10-CM

## 2020-03-05 DIAGNOSIS — E78.00 PURE HYPERCHOLESTEROLEMIA: ICD-10-CM

## 2020-03-05 DIAGNOSIS — R21 RASH: ICD-10-CM

## 2020-03-05 DIAGNOSIS — E66.3 OVERWEIGHT (BMI 25.0-29.9): ICD-10-CM

## 2020-03-12 ENCOUNTER — TELEPHONE (OUTPATIENT)
Dept: ENDOCRINOLOGY | Facility: CLINIC | Age: 60
End: 2020-03-12

## 2020-03-12 NOTE — TELEPHONE ENCOUNTER
Spoke with patient to remind him of his appointment and to do his blood work  He is excited to come for the appointment because his diabetic numbers are really good

## 2020-03-13 ENCOUNTER — OFFICE VISIT (OUTPATIENT)
Dept: ENDOCRINOLOGY | Facility: CLINIC | Age: 60
End: 2020-03-13
Payer: COMMERCIAL

## 2020-03-13 VITALS
WEIGHT: 195 LBS | HEART RATE: 84 BPM | SYSTOLIC BLOOD PRESSURE: 138 MMHG | DIASTOLIC BLOOD PRESSURE: 74 MMHG | HEIGHT: 74 IN | BODY MASS INDEX: 25.03 KG/M2

## 2020-03-13 DIAGNOSIS — E78.00 PURE HYPERCHOLESTEROLEMIA: ICD-10-CM

## 2020-03-13 DIAGNOSIS — E11.65 TYPE 2 DIABETES MELLITUS WITH HYPERGLYCEMIA, WITHOUT LONG-TERM CURRENT USE OF INSULIN (HCC): ICD-10-CM

## 2020-03-13 DIAGNOSIS — I10 HYPERTENSION GOAL BP (BLOOD PRESSURE) < 140/90: ICD-10-CM

## 2020-03-13 DIAGNOSIS — E11.65 TYPE 2 DIABETES MELLITUS WITH HYPERGLYCEMIA, WITHOUT LONG-TERM CURRENT USE OF INSULIN (HCC): Primary | ICD-10-CM

## 2020-03-13 PROCEDURE — 3075F SYST BP GE 130 - 139MM HG: CPT | Performed by: INTERNAL MEDICINE

## 2020-03-13 PROCEDURE — 3078F DIAST BP <80 MM HG: CPT | Performed by: INTERNAL MEDICINE

## 2020-03-13 PROCEDURE — 3008F BODY MASS INDEX DOCD: CPT | Performed by: INTERNAL MEDICINE

## 2020-03-13 PROCEDURE — 1036F TOBACCO NON-USER: CPT | Performed by: INTERNAL MEDICINE

## 2020-03-13 PROCEDURE — 99214 OFFICE O/P EST MOD 30 MIN: CPT | Performed by: INTERNAL MEDICINE

## 2020-03-13 RX ORDER — FLASH GLUCOSE SENSOR
KIT MISCELLANEOUS
Qty: 2 EACH | Refills: 12 | Status: SHIPPED | OUTPATIENT
Start: 2020-03-13 | End: 2021-03-15 | Stop reason: SDUPTHER

## 2020-03-13 RX ORDER — FLASH GLUCOSE SCANNING READER
EACH MISCELLANEOUS
Qty: 1 DEVICE | Refills: 0 | Status: SHIPPED | OUTPATIENT
Start: 2020-03-13

## 2020-03-13 NOTE — PATIENT INSTRUCTIONS
Send me blood sugar log in 2 weeks for review    Have labs done in 2 months    Follow up in 2 months

## 2020-03-13 NOTE — PROGRESS NOTES
Established Patient Progress Note      CC: diabetes     History of Present Illness:   Nacho Joseph is a 64 y o  male with a history of type 2 diabetes without long term use of insulin since 10 years  He does not report any complications  He denies any history of stroke or myocardial infarction  Denies recent illness or hospitalizations  Denies recent severe hypoglycemic or severe hyperglycemic episodes  Denies any issues with his current regimen  home glucose monitoring: are performed regularly - 4x daily  Patient admits to family history of diabetes in mother, brother and multiple sisters  Denies smoking, alcohol       Home blood glucose readings:   Before breakfast: 157-196  Before lunch: 115-255  Before dinner:   Bedtime: 125-264     Current regimen: metformin 1000 mg BID, glimepiride 2 mg daily    Last Eye Exam: <1 year ago  Last Foot Exam: does not follow up with podiatry     Has hypertension: no  Has hyperlipidemia: Taking lipitor   Thyroid disorders: no    Patient Active Problem List   Diagnosis    Mixed hyperlipidemia    Type 2 diabetes mellitus without complication, without long-term current use of insulin ([facility] )    Need for hepatitis C screening test      Past Medical History:   Diagnosis Date    [facility] )       Past Surgical History:   Procedure Laterality Date    FRACTURE SURGERY        Family History   Problem Relation Age of Onset    Diabetes Mother     Diabetes Sister     Diabetes Brother     Pancreatic cancer Sister     Aneurysm Sister         vertebral artery aneurysm     Social History     Tobacco Use    Smoking status: Former Smoker     Types: Cigarettes    Smokeless tobacco: Never Used   Substance Use Topics    Alcohol use: No     Comment: 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)     Allergies   Allergen Reactions    Delsym [Dextromethorphan] Seizures    Epinephrine Other (See Comments)     Had syncopal episode, with possible seizure like activity; had injection for biopsies    Lidocaine Other (See Comments)     Had syncopal episode, with possible seizure like activity; had injection for biopsies         Current Outpatient Medications:     ACCU-CHEK FASTCLIX LANCETS MISC, Use 1 lancet a day to check blood sugar, E11 65, Disp: 100 each, Rfl: 3    aspirin (ECOTRIN LOW STRENGTH) 81 mg EC tablet, Take 81 mg by mouth daily, Disp: , Rfl:     atorvastatin (LIPITOR) 20 mg tablet, Take 1 tablet (20 mg total) by mouth daily, Disp: 90 tablet, Rfl: 3    calcium carbonate (OS-EDIE) 1250 (500 Ca) MG chewable tablet, Chew 2 tablets 2 (two) times a day as needed  , Disp: , Rfl:     cyanocobalamin (VITAMIN B-12) 100 mcg tablet, Take 1,000 mcg by mouth daily, Disp: , Rfl:     Flaxseed, Linseed, (FLAXSEED OIL) 1000 MG CAPS, Take by mouth, Disp: , Rfl:     glimepiride (AMARYL) 2 mg tablet, Take 1 tablet (2 mg total) by mouth daily with breakfast, Disp: 90 tablet, Rfl: 3    glucose blood (ACCU-CHEK GUIDE) test strip, Use 1 test strip a day to check blood sugar, E11 65, Disp: 100 each, Rfl: 3    glucose blood (Accu-Chek Guide) test strip, Use one test strip a day to test blood sugar, Disp: 100 each, Rfl: 3    metFORMIN (GLUCOPHAGE) 500 mg tablet, Take 2 tablets (1,000 mg total) by mouth 2 (two) times a day with meals, Disp: 360 tablet, Rfl: 3    triamcinolone (KENALOG) 0 1 % ointment, Apply topically 2 (two) times a day To rash, Disp: 454 g, Rfl: 0    ketoconazole (NIZORAL) 2 % cream, Apply topically daily To face (Patient not taking: Reported on 3/2/2020), Disp: 60 g, Rfl: 5    Review of Systems   Constitutional: Negative for fatigue and unexpected weight change  HENT: Negative for congestion, postnasal drip and sore throat  Cough   Eyes: Negative for pain and redness  Respiratory: Negative for cough, chest tightness, shortness of breath and wheezing  Cardiovascular: Negative for chest pain, palpitations and leg swelling     Gastrointestinal: Negative for abdominal pain, constipation, diarrhea, nausea and vomiting  Endocrine: Negative for polydipsia and polyuria  Genitourinary: Negative for dysuria  Musculoskeletal: Negative for arthralgias and back pain  Neurological: Negative for dizziness, light-headedness and headaches  Psychiatric/Behavioral: Negative for agitation  The patient is not nervous/anxious  All other systems reviewed and are negative  Physical Exam:  Body mass index is 25 04 kg/m²  /74   Pulse 84   Ht 6' 2" (1 88 m)   Wt 88 5 kg (195 lb)   BMI 25 04 kg/m²    Wt Readings from Last 3 Encounters:   03/13/20 88 5 kg (195 lb)   03/02/20 88 kg (194 lb)   12/30/19 87 5 kg (192 lb 14 4 oz)       Physical Exam   Constitutional: He appears well-developed and well-nourished  HENT:   Head: Normocephalic and atraumatic  Mouth/Throat: No oropharyngeal exudate  Eyes: Pupils are equal, round, and reactive to light  Conjunctivae are normal    Neck: Neck supple  Cardiovascular: Normal rate and regular rhythm  Pulses are no weak pulses  No murmur heard  Pulses:       Dorsalis pedis pulses are 2+ on the right side, and 2+ on the left side  Posterior tibial pulses are 2+ on the right side, and 2+ on the left side  Pulmonary/Chest: Effort normal and breath sounds normal  No respiratory distress  Abdominal: Soft  He exhibits no distension  There is no tenderness  Musculoskeletal: He exhibits no edema  Feet:   Right Foot:   Skin Integrity: Negative for ulcer, skin breakdown, erythema, warmth, callus or dry skin  Left Foot:   Skin Integrity: Negative for ulcer, skin breakdown, erythema, warmth, callus or dry skin  Neurological: He is alert  Skin: Skin is warm and dry  No erythema  Psychiatric: He has a normal mood and affect  Patient's shoes and socks removed  Right Foot/Ankle   Right Foot Inspection  Skin Exam: skin normal and skin intact no dry skin, no warmth, no callus, no erythema, no maceration, no abnormal color, no pre-ulcer, no ulcer and no callus                          Toe Exam: ROM and strength within normal limitsno swelling, no tenderness, erythema and  no right toe deformity  Sensory   Vibration: intact  Proprioception: intact   Monofilament testing: intact  Vascular    The right DP pulse is 2+  The right PT pulse is 2+  Left Foot/Ankle  Left Foot Inspection  Skin Exam: skin normal and skin intactno dry skin, no warmth, no erythema, no maceration, normal color, no pre-ulcer, no ulcer and no callus                         Toe Exam: ROM and strength within normal limitsno swelling, no tenderness, no erythema and no left toe deformity                   Sensory   Vibration: intact  Proprioception: intact  Monofilament: intact  Vascular    The left DP pulse is 2+  The left PT pulse is 2+  Assign Risk Category:  No deformity present; No loss of protective sensation; No weak pulses       Risk: 0      Labs:   Lab Results   Component Value Date    HGBA1C 8 3 (H) 12/23/2019    HGBA1C 8 1 (A) 10/30/2019    HGBA1C 6 8 (H) 08/24/2018     Lab Results   Component Value Date    CREATININE 0 99 12/30/2019    CREATININE 1 02 12/23/2019    CREATININE 1 02 08/24/2018    BUN 16 12/30/2019    K 4 5 12/30/2019     12/30/2019    CO2 26 12/30/2019     eGFR   Date Value Ref Range Status   12/30/2019 84 ml/min/1 73sq m Final     Lab Results   Component Value Date    HDL 44 12/23/2019    TRIG 88 12/23/2019     Lab Results   Component Value Date    ALT 30 12/30/2019    AST 24 12/30/2019    ALKPHOS 88 12/30/2019     Lab Results   Component Value Date    XWK7KIYPUVIS 1 708 12/19/2016     No results found for: FREET4, TSI    Impression & Plan:    Diagnoses and all orders for this visit:    Type 2 diabetes mellitus with hyperglycemia, without long-term current use of insulin ([address] ) - patient's blood sugars much improved  I will continue current medical management    Patient is refusing going to see a dietitian  He made dietary changes on his own  Patient was advised to measure his blood sugars 2 times a day and provide office with a log of his sugars every 2 weeks  -     Hemoglobin A1C; Future  -     Comprehensive metabolic panel; Future  -     Lipid panel; Future  -     Continuous Blood Gluc  (FREESTYLE JESSICA 14 DAY READER) ROSA; Use reader device to check blood sugar  -     Continuous Blood Gluc Sensor (FREESTYLE JESSICA 14 DAY SENSOR) MISC; Use 1 sensor every 14 days to check blood sugar  -     glucose blood (Accu-Chek Guide) test strip; Use 1 test strip a day to check blood sugar, E11 65    Pure hypercholesterolemia - I will continue Lipitor  -     Hemoglobin A1C; Future  -     Comprehensive metabolic panel; Future  -     Lipid panel; Future    Hypertension goal BP (blood pressure) < 140/90 - patient's blood pressure is well control, he is currently not taking any medications  -     Hemoglobin A1C; Future  -     Comprehensive metabolic panel; Future  -     Lipid panel; Future    Overweight (BMI 25 0-29  9)    Rash      No orders of the defined types were placed in this encounter  There are no Patient Instructions on file for this visit  Discussed with the patient and all questioned fully answered  He will call me if any problems arise  Follow-up appointment in 2 months       Counseled patient on diagnostic results, prognosis, risk and benefit of treatment options, instruction for management, importance of treatment compliance, Risk  factor reduction and impressions    Corina Marrero MD

## 2020-03-16 DIAGNOSIS — E66.3 OVERWEIGHT (BMI 25.0-29.9): ICD-10-CM

## 2020-03-16 DIAGNOSIS — R21 RASH: ICD-10-CM

## 2020-03-16 DIAGNOSIS — E78.00 PURE HYPERCHOLESTEROLEMIA: ICD-10-CM

## 2020-03-16 DIAGNOSIS — E11.65 TYPE 2 DIABETES MELLITUS WITH HYPERGLYCEMIA, WITHOUT LONG-TERM CURRENT USE OF INSULIN (HCC): ICD-10-CM

## 2020-03-16 DIAGNOSIS — I10 HYPERTENSION GOAL BP (BLOOD PRESSURE) < 140/90: ICD-10-CM

## 2020-03-16 RX ORDER — LANCETS
EACH MISCELLANEOUS
Qty: 100 EACH | Refills: 3 | Status: SHIPPED | OUTPATIENT
Start: 2020-03-16 | End: 2020-03-20 | Stop reason: SDUPTHER

## 2020-03-20 DIAGNOSIS — E66.3 OVERWEIGHT (BMI 25.0-29.9): ICD-10-CM

## 2020-03-20 DIAGNOSIS — E78.00 PURE HYPERCHOLESTEROLEMIA: ICD-10-CM

## 2020-03-20 DIAGNOSIS — E11.65 TYPE 2 DIABETES MELLITUS WITH HYPERGLYCEMIA, WITHOUT LONG-TERM CURRENT USE OF INSULIN (HCC): ICD-10-CM

## 2020-03-20 DIAGNOSIS — I10 HYPERTENSION GOAL BP (BLOOD PRESSURE) < 140/90: ICD-10-CM

## 2020-03-20 DIAGNOSIS — R21 RASH: ICD-10-CM

## 2020-03-20 RX ORDER — GLIMEPIRIDE 2 MG/1
2 TABLET ORAL
Qty: 90 TABLET | Refills: 3 | Status: SHIPPED | OUTPATIENT
Start: 2020-03-20 | End: 2020-05-28

## 2020-03-20 RX ORDER — ATORVASTATIN CALCIUM 20 MG/1
20 TABLET, FILM COATED ORAL DAILY
Qty: 90 TABLET | Refills: 3 | Status: SHIPPED | OUTPATIENT
Start: 2020-03-20 | End: 2020-06-26 | Stop reason: SDUPTHER

## 2020-03-20 RX ORDER — LANCETS
EACH MISCELLANEOUS
Qty: 100 EACH | Refills: 3 | Status: SHIPPED | OUTPATIENT
Start: 2020-03-20 | End: 2021-11-30 | Stop reason: SDUPTHER

## 2020-04-06 ENCOUNTER — TELEPHONE (OUTPATIENT)
Dept: ENDOCRINOLOGY | Facility: CLINIC | Age: 60
End: 2020-04-06

## 2020-04-13 ENCOUNTER — TELEMEDICINE (OUTPATIENT)
Dept: ENDOCRINOLOGY | Facility: CLINIC | Age: 60
End: 2020-04-13
Payer: COMMERCIAL

## 2020-04-13 DIAGNOSIS — I10 HYPERTENSION GOAL BP (BLOOD PRESSURE) < 140/90: ICD-10-CM

## 2020-04-13 DIAGNOSIS — E78.00 PURE HYPERCHOLESTEROLEMIA: ICD-10-CM

## 2020-04-13 DIAGNOSIS — E11.65 TYPE 2 DIABETES MELLITUS WITH HYPERGLYCEMIA, WITHOUT LONG-TERM CURRENT USE OF INSULIN (HCC): Primary | ICD-10-CM

## 2020-04-13 PROCEDURE — 99214 OFFICE O/P EST MOD 30 MIN: CPT | Performed by: INTERNAL MEDICINE

## 2020-05-04 ENCOUNTER — TELEMEDICINE (OUTPATIENT)
Dept: DERMATOLOGY | Facility: CLINIC | Age: 60
End: 2020-05-04
Payer: COMMERCIAL

## 2020-05-04 DIAGNOSIS — L21.9 SEBORRHEIC DERMATITIS: Primary | ICD-10-CM

## 2020-05-04 PROCEDURE — 99213 OFFICE O/P EST LOW 20 MIN: CPT | Performed by: DERMATOLOGY

## 2020-05-04 RX ORDER — KETOCONAZOLE 20 MG/G
CREAM TOPICAL
Qty: 60 G | Refills: 5 | Status: SHIPPED | OUTPATIENT
Start: 2020-05-04

## 2020-05-13 ENCOUNTER — TELEMEDICINE (OUTPATIENT)
Dept: ENDOCRINOLOGY | Facility: CLINIC | Age: 60
End: 2020-05-13
Payer: COMMERCIAL

## 2020-05-13 DIAGNOSIS — E11.65 TYPE 2 DIABETES MELLITUS WITH HYPERGLYCEMIA, WITHOUT LONG-TERM CURRENT USE OF INSULIN (HCC): Primary | ICD-10-CM

## 2020-05-13 DIAGNOSIS — I10 HYPERTENSION GOAL BP (BLOOD PRESSURE) < 140/90: ICD-10-CM

## 2020-05-13 DIAGNOSIS — E78.00 PURE HYPERCHOLESTEROLEMIA: ICD-10-CM

## 2020-05-13 PROCEDURE — 99214 OFFICE O/P EST MOD 30 MIN: CPT | Performed by: INTERNAL MEDICINE

## 2020-05-14 ENCOUNTER — TELEPHONE (OUTPATIENT)
Dept: ENDOCRINOLOGY | Facility: CLINIC | Age: 60
End: 2020-05-14

## 2020-05-28 ENCOUNTER — TELEPHONE (OUTPATIENT)
Dept: OTHER | Facility: OTHER | Age: 60
End: 2020-05-28

## 2020-05-28 ENCOUNTER — TELEPHONE (OUTPATIENT)
Dept: OTHER | Facility: HOSPITAL | Age: 60
End: 2020-05-28

## 2020-05-28 DIAGNOSIS — E78.00 PURE HYPERCHOLESTEROLEMIA: ICD-10-CM

## 2020-05-28 DIAGNOSIS — I10 HYPERTENSION GOAL BP (BLOOD PRESSURE) < 140/90: ICD-10-CM

## 2020-05-28 DIAGNOSIS — E11.65 TYPE 2 DIABETES MELLITUS WITH HYPERGLYCEMIA, WITHOUT LONG-TERM CURRENT USE OF INSULIN (HCC): ICD-10-CM

## 2020-05-28 RX ORDER — GLIMEPIRIDE 2 MG/1
1 TABLET ORAL
Qty: 90 TABLET | Refills: 0 | Status: SHIPPED | OUTPATIENT
Start: 2020-05-28 | End: 2020-05-29

## 2020-05-29 DIAGNOSIS — E11.65 TYPE 2 DIABETES MELLITUS WITH HYPERGLYCEMIA, WITHOUT LONG-TERM CURRENT USE OF INSULIN (HCC): Primary | ICD-10-CM

## 2020-05-29 RX ORDER — GLIMEPIRIDE 1 MG/1
1 TABLET ORAL
Qty: 90 TABLET | Refills: 1 | Status: SHIPPED | OUTPATIENT
Start: 2020-05-29 | End: 2020-06-26 | Stop reason: SDUPTHER

## 2020-06-22 ENCOUNTER — APPOINTMENT (OUTPATIENT)
Dept: LAB | Facility: CLINIC | Age: 60
End: 2020-06-22
Payer: COMMERCIAL

## 2020-06-22 DIAGNOSIS — E83.52 HYPERCALCEMIA: ICD-10-CM

## 2020-06-22 DIAGNOSIS — E78.00 PURE HYPERCHOLESTEROLEMIA: ICD-10-CM

## 2020-06-22 DIAGNOSIS — I10 HYPERTENSION GOAL BP (BLOOD PRESSURE) < 140/90: ICD-10-CM

## 2020-06-22 DIAGNOSIS — E11.65 TYPE 2 DIABETES MELLITUS WITH HYPERGLYCEMIA, WITHOUT LONG-TERM CURRENT USE OF INSULIN (HCC): ICD-10-CM

## 2020-06-22 DIAGNOSIS — R77.8 ELEVATED TOTAL PROTEIN: ICD-10-CM

## 2020-06-22 LAB
ALBUMIN SERPL BCP-MCNC: 3.9 G/DL (ref 3.5–5)
ALP SERPL-CCNC: 103 U/L (ref 46–116)
ALT SERPL W P-5'-P-CCNC: 29 U/L (ref 12–78)
ANION GAP SERPL CALCULATED.3IONS-SCNC: 7 MMOL/L (ref 4–13)
AST SERPL W P-5'-P-CCNC: 20 U/L (ref 5–45)
BILIRUB SERPL-MCNC: 0.54 MG/DL (ref 0.2–1)
BUN SERPL-MCNC: 16 MG/DL (ref 5–25)
CALCIUM SERPL-MCNC: 9.3 MG/DL (ref 8.3–10.1)
CHLORIDE SERPL-SCNC: 103 MMOL/L (ref 100–108)
CHOLEST SERPL-MCNC: 143 MG/DL (ref 50–200)
CO2 SERPL-SCNC: 29 MMOL/L (ref 21–32)
CREAT SERPL-MCNC: 0.92 MG/DL (ref 0.6–1.3)
CREAT UR-MCNC: 139 MG/DL
EST. AVERAGE GLUCOSE BLD GHB EST-MCNC: 148 MG/DL
GFR SERPL CREATININE-BSD FRML MDRD: 91 ML/MIN/1.73SQ M
GLUCOSE P FAST SERPL-MCNC: 122 MG/DL (ref 65–99)
HBA1C MFR BLD: 6.8 %
HDLC SERPL-MCNC: 45 MG/DL
LDLC SERPL CALC-MCNC: 87 MG/DL (ref 0–100)
MICROALBUMIN UR-MCNC: 10.2 MG/L (ref 0–20)
MICROALBUMIN/CREAT 24H UR: 7 MG/G CREATININE (ref 0–30)
NONHDLC SERPL-MCNC: 98 MG/DL
POTASSIUM SERPL-SCNC: 4 MMOL/L (ref 3.5–5.3)
PROT SERPL-MCNC: 7.9 G/DL (ref 6.4–8.2)
SODIUM SERPL-SCNC: 139 MMOL/L (ref 136–145)
TRIGL SERPL-MCNC: 54 MG/DL

## 2020-06-22 PROCEDURE — 83036 HEMOGLOBIN GLYCOSYLATED A1C: CPT

## 2020-06-22 PROCEDURE — 3061F NEG MICROALBUMINURIA REV: CPT | Performed by: INTERNAL MEDICINE

## 2020-06-22 PROCEDURE — 80061 LIPID PANEL: CPT

## 2020-06-22 PROCEDURE — 82043 UR ALBUMIN QUANTITATIVE: CPT

## 2020-06-22 PROCEDURE — 3044F HG A1C LEVEL LT 7.0%: CPT | Performed by: INTERNAL MEDICINE

## 2020-06-22 PROCEDURE — 80053 COMPREHEN METABOLIC PANEL: CPT

## 2020-06-22 PROCEDURE — 82570 ASSAY OF URINE CREATININE: CPT

## 2020-06-22 PROCEDURE — 36415 COLL VENOUS BLD VENIPUNCTURE: CPT

## 2020-06-26 ENCOUNTER — OFFICE VISIT (OUTPATIENT)
Dept: ENDOCRINOLOGY | Facility: CLINIC | Age: 60
End: 2020-06-26
Payer: COMMERCIAL

## 2020-06-26 VITALS
HEART RATE: 88 BPM | HEIGHT: 74 IN | TEMPERATURE: 97.7 F | WEIGHT: 200.4 LBS | SYSTOLIC BLOOD PRESSURE: 130 MMHG | DIASTOLIC BLOOD PRESSURE: 80 MMHG | BODY MASS INDEX: 25.72 KG/M2

## 2020-06-26 DIAGNOSIS — E78.00 PURE HYPERCHOLESTEROLEMIA: ICD-10-CM

## 2020-06-26 DIAGNOSIS — I10 HYPERTENSION GOAL BP (BLOOD PRESSURE) < 140/90: ICD-10-CM

## 2020-06-26 DIAGNOSIS — E11.65 TYPE 2 DIABETES MELLITUS WITH HYPERGLYCEMIA, WITHOUT LONG-TERM CURRENT USE OF INSULIN (HCC): Primary | ICD-10-CM

## 2020-06-26 PROCEDURE — 3044F HG A1C LEVEL LT 7.0%: CPT | Performed by: INTERNAL MEDICINE

## 2020-06-26 PROCEDURE — 1036F TOBACCO NON-USER: CPT | Performed by: INTERNAL MEDICINE

## 2020-06-26 PROCEDURE — 99214 OFFICE O/P EST MOD 30 MIN: CPT | Performed by: INTERNAL MEDICINE

## 2020-06-26 PROCEDURE — 3079F DIAST BP 80-89 MM HG: CPT | Performed by: INTERNAL MEDICINE

## 2020-06-26 PROCEDURE — 95251 CONT GLUC MNTR ANALYSIS I&R: CPT | Performed by: INTERNAL MEDICINE

## 2020-06-26 PROCEDURE — 3008F BODY MASS INDEX DOCD: CPT | Performed by: INTERNAL MEDICINE

## 2020-06-26 PROCEDURE — 3075F SYST BP GE 130 - 139MM HG: CPT | Performed by: INTERNAL MEDICINE

## 2020-06-26 RX ORDER — ATORVASTATIN CALCIUM 20 MG/1
20 TABLET, FILM COATED ORAL DAILY
Qty: 90 TABLET | Refills: 3 | Status: SHIPPED | OUTPATIENT
Start: 2020-06-26 | End: 2021-05-03 | Stop reason: SDUPTHER

## 2020-06-26 RX ORDER — GLIMEPIRIDE 1 MG/1
1 TABLET ORAL
Qty: 90 TABLET | Refills: 1 | Status: SHIPPED | OUTPATIENT
Start: 2020-06-26 | End: 2021-04-28

## 2020-09-28 ENCOUNTER — OFFICE VISIT (OUTPATIENT)
Dept: ENDOCRINOLOGY | Facility: CLINIC | Age: 60
End: 2020-09-28
Payer: COMMERCIAL

## 2020-09-28 VITALS
HEIGHT: 74 IN | TEMPERATURE: 98.6 F | BODY MASS INDEX: 26.18 KG/M2 | DIASTOLIC BLOOD PRESSURE: 82 MMHG | SYSTOLIC BLOOD PRESSURE: 160 MMHG | WEIGHT: 204 LBS | HEART RATE: 70 BPM

## 2020-09-28 DIAGNOSIS — E11.65 TYPE 2 DIABETES MELLITUS WITH HYPERGLYCEMIA, WITHOUT LONG-TERM CURRENT USE OF INSULIN (HCC): Primary | ICD-10-CM

## 2020-09-28 DIAGNOSIS — I10 HYPERTENSION GOAL BP (BLOOD PRESSURE) < 140/90: ICD-10-CM

## 2020-09-28 DIAGNOSIS — E78.00 PURE HYPERCHOLESTEROLEMIA: ICD-10-CM

## 2020-09-28 PROCEDURE — 99214 OFFICE O/P EST MOD 30 MIN: CPT | Performed by: INTERNAL MEDICINE

## 2020-09-28 PROCEDURE — 3079F DIAST BP 80-89 MM HG: CPT | Performed by: INTERNAL MEDICINE

## 2020-09-28 PROCEDURE — 95251 CONT GLUC MNTR ANALYSIS I&R: CPT | Performed by: INTERNAL MEDICINE

## 2020-09-28 PROCEDURE — 3077F SYST BP >= 140 MM HG: CPT | Performed by: INTERNAL MEDICINE

## 2020-09-28 PROCEDURE — 1036F TOBACCO NON-USER: CPT | Performed by: INTERNAL MEDICINE

## 2020-09-28 NOTE — PATIENT INSTRUCTIONS
Stop glimepiride    Continue metformin 1000mg twice a day    Have labs done now    Drop off Nya device for reading in 4 weeks    Follow up in 3 months

## 2020-09-28 NOTE — PROGRESS NOTES
ENDOCRINOLOGY  FOLLOW UP VISIT      Reason for Endocrine Consult/Chief Complaint: DM management     ? Medical Decision Making:     Impression  1  DM2  2  HLD  3  HTN  4  Rash- saw dermatology on topical steroids         Recommendations:  ?  BGs have now improved now that he has made significant dietary changes      Having some low BGs throughout the day and overnight  Will stop glimepiride  For now continue metformin 1000mg BID AC only  Due for all diabetes labs now      Drop off Nya device for reading in 4 weeks     Hyperlipidemia-LDL improved to 87 June 2020, on atorvastatin 20mg qday continue, repeat lipids now      HTN- systolic mildly high today 160, repeat level at next visit and if still elevated will start therapy      Freestyle Nya CGM interpretation 9/28/20     CGM worn 93% of time Sept 15th-28th, 2020  Time in range 83% (goal >65-70%)  Hypoglycemia 5%  CV 32 4% (goal <33%)     Average glucose 127 mg/dL  GMI 6 3%     Having daytime hypoglycemia along with overnight hypoglycemia  Will stop LOPEZ and continue metformin only to decrease frequency of low BGs        RTC in 3 months  Artis ROBLEDO      History of Present Illness:    Mr Phyllis Gonzales a 72-year-old male who presents for diabetes management-follow up    ? PMH-diabetes, hyperlipidemia  PSH-fracture surgery  FHx-mother with diabetes and sister with diabetes and brother with diabetes  SHx-works at [employer], neg x 3      Type of DM:  2  Age of onset: 10 years   Microvascular complications:  None known  Macrovascular complications:  None known        Events since last visit:     On metformin 1000mg twice a day and glimepiride 1mg qday        ? Review of Systems:     Review of Systems   Constitutional: Negative for appetite change, chills, diaphoresis, fatigue, fever and unexpected weight change     HENT: Negative for congestion, ear pain, hearing loss, rhinorrhea, sinus pressure, sinus pain, sore throat, trouble swallowing and voice change  Eyes: Negative for photophobia, redness and visual disturbance  Respiratory: Negative for apnea, cough, chest tightness, shortness of breath, wheezing and stridor  Cardiovascular: Negative for chest pain, palpitations and leg swelling  Gastrointestinal: Negative for abdominal distention, abdominal pain, constipation, diarrhea, nausea and vomiting  Endocrine: Negative for cold intolerance, heat intolerance, polydipsia, polyphagia and polyuria  Genitourinary: Negative for difficulty urinating, dysuria, flank pain, frequency, hematuria and urgency  Musculoskeletal: Negative for arthralgias, back pain, gait problem, joint swelling and myalgias  Skin: Negative for color change, pallor, rash and wound  Allergic/Immunologic: Negative for immunocompromised state  Neurological: Negative for dizziness, tremors, syncope, weakness, light-headedness and headaches  Hematological: Negative for adenopathy  Does not bruise/bleed easily  Psychiatric/Behavioral: Negative for confusion and sleep disturbance  The patient is not nervous/anxious  ?   Patient History:     Past Medical History:   Diagnosis Date    [facility]      Past Surgical History:   Procedure Laterality Date    FRACTURE SURGERY       Social History     Socioeconomic History    Marital status: /Civil Union     Spouse name: Not on file    Number of children: Not on file    Years of education: Not on file    Highest education level: Not on file   Occupational History    Not on file   Social Needs    Financial resource strain: Not on file    Food insecurity     Worry: Not on file     Inability: Not on file   [employer] needs     Medical: Not on file     Non-medical: Not on file   Tobacco Use    Smoking status: Former Smoker     Types: Cigarettes    Smokeless tobacco: Never Used   Substance and Sexual Activity    Alcohol use: No     Comment: 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)  Drug use: No    Sexual activity: Not on file   Lifestyle    Physical activity     Days per week: Not on file     Minutes per session: Not on file    Stress: Not on file   Relationships    Social connections     Talks on phone: Not on file     Gets together: Not on file     Attends [employer] service: Not on file     Active member of club or organization: Not on file     Attends meetings of clubs or organizations: Not on file     Relationship status: Not on file    Intimate partner violence     Fear of current or ex partner: Not on file     Emotionally abused: Not on file     Physically abused: Not on file     Forced sexual activity: Not on file   Other Topics Concern    Not on file   Social History Narrative    Not on file     Family History   Problem Relation Age of Onset    Diabetes Mother     Diabetes Sister     Diabetes Brother     Pancreatic cancer Sister     Aneurysm Sister         vertebral artery aneurysm       Current Medications: At the time this note was written these were the medications the patient was on    Current Outpatient Medications   Medication Sig Dispense Refill    Accu-Chek FastClix Lancets MISC Use 1 lancet a day to check blood sugar, E11 65 100 each 3    aspirin (ECOTRIN LOW STRENGTH) 81 mg EC tablet Take 81 mg by mouth daily      atorvastatin (LIPITOR) 20 mg tablet Take 1 tablet (20 mg total) by mouth daily 90 tablet 3    calcium carbonate (OS-EDIE) 1250 (500 Ca) MG chewable tablet Chew 2 tablets 2 (two) times a day as needed        Continuous Blood Gluc  (FREESTYLE JESSICA 14 DAY READER) ROSA Use reader device to check blood sugar 1 Device 0    Continuous Blood Gluc Sensor (FREESTYLE JESSICA 14 DAY SENSOR) MISC Use 1 sensor every 14 days to check blood sugar 2 each 12    cyanocobalamin (VITAMIN B-12) 100 mcg tablet Take 1,000 mcg by mouth daily      Flaxseed, Linseed, (FLAXSEED OIL) 1000 MG CAPS Take by mouth      glimepiride (AMARYL) 1 mg tablet Take 1 tablet (1 mg total) by mouth daily with breakfast 90 tablet 1    glucose 4 g chewable tablet Chew 4 tablets (16 g total) as needed for low blood sugar 100 tablet 1    glucose blood (Accu-Chek Guide) test strip Use 1 test strip a day to check blood sugar, E11 65 100 each 3    glucose blood (Accu-Chek Guide) test strip Use to test blood sugar 2 times a day 100 each 3    ketoconazole (NIZORAL) 2 % cream Apply to face twice a day when flared, use twice a week when not flared 60 g 5    metFORMIN (GLUCOPHAGE) 500 mg tablet Take 2 tablets (1,000 mg total) by mouth 2 (two) times a day with meals 360 tablet 3    triamcinolone (KENALOG) 0 1 % ointment Apply topically 2 (two) times a day To rash 454 g 0     No current facility-administered medications for this visit  Allergies: Delsym [dextromethorphan]; Epinephrine; and Lidocaine    Physical Exam:   Vital Signs:   /82   Pulse 70   Temp 98 6 °F (37 °C)   Ht 6' 2" (1 88 m)   Wt 92 5 kg (204 lb)   BMI 26 19 kg/m²     Physical Exam  Vitals signs reviewed  Constitutional:       General: He is not in acute distress  Appearance: Normal appearance  He is not ill-appearing, toxic-appearing or diaphoretic  HENT:      Head: Normocephalic and atraumatic  Right Ear: External ear normal       Left Ear: External ear normal       Nose: Nose normal    Eyes:      General: No scleral icterus  Extraocular Movements: Extraocular movements intact  Conjunctiva/sclera: Conjunctivae normal    Neck:      Musculoskeletal: Normal range of motion and neck supple  No muscular tenderness  Comments: No thyromegaly  Cardiovascular:      Rate and Rhythm: Normal rate and regular rhythm  Heart sounds: Normal heart sounds  No murmur  No friction rub  No gallop  Pulmonary:      Effort: Pulmonary effort is normal  No respiratory distress  Breath sounds: Normal breath sounds  No stridor  No wheezing, rhonchi or rales     Abdominal:      General: Bowel sounds are normal  There is no distension  Palpations: Abdomen is soft  There is no mass  Tenderness: There is no abdominal tenderness  There is no guarding or rebound  Hernia: No hernia is present  Musculoskeletal: Normal range of motion  General: No swelling  Lymphadenopathy:      Cervical: No cervical adenopathy  Skin:     General: Skin is warm and dry  Coloration: Skin is not pale  Findings: No erythema or rash  Neurological:      General: No focal deficit present  Mental Status: He is alert and oriented to person, place, and time  Psychiatric:         Mood and Affect: Mood normal          Behavior: Behavior normal          Thought Content:  Thought content normal          Judgment: Judgment normal           Labs and Imaging:    No recent labs

## 2020-10-06 ENCOUNTER — TELEPHONE (OUTPATIENT)
Dept: ENDOCRINOLOGY | Facility: CLINIC | Age: 60
End: 2020-10-06

## 2021-01-05 ENCOUNTER — TELEPHONE (OUTPATIENT)
Dept: ENDOCRINOLOGY | Facility: CLINIC | Age: 61
End: 2021-01-05

## 2021-01-05 NOTE — TELEPHONE ENCOUNTER
Patient called to ask to be put back on Glimepiride because his numbers are getting high  I asked him to send us a report  He claims he gets billed like $80 00 with every download  He will confirm that information with his wife  He will get us numbers somehow

## 2021-01-06 ENCOUNTER — TELEPHONE (OUTPATIENT)
Dept: ENDOCRINOLOGY | Facility: CLINIC | Age: 61
End: 2021-01-06

## 2021-01-06 NOTE — TELEPHONE ENCOUNTER
Spoke with patient and reviewed bg log  He understood  He wants to hold off on making an appointment

## 2021-01-06 NOTE — TELEPHONE ENCOUNTER
Please let patient know I reviewed blood sugar log    Occasionally having high blood sugars in the morning or during the daytime however some days his blood sugars are good    Would continue metformin for now, I think adding the glimepiride again will cause low blood sugars    Schedule follow-up appointment to discuss further medication options

## 2021-03-13 DIAGNOSIS — E11.65 TYPE 2 DIABETES MELLITUS WITH HYPERGLYCEMIA, WITHOUT LONG-TERM CURRENT USE OF INSULIN (HCC): ICD-10-CM

## 2021-03-15 DIAGNOSIS — E11.65 TYPE 2 DIABETES MELLITUS WITH HYPERGLYCEMIA, WITHOUT LONG-TERM CURRENT USE OF INSULIN (HCC): ICD-10-CM

## 2021-03-15 RX ORDER — FLASH GLUCOSE SENSOR
KIT MISCELLANEOUS
Qty: 2 EACH | Refills: 12 | Status: SHIPPED | OUTPATIENT
Start: 2021-03-15 | End: 2021-07-02 | Stop reason: SDUPTHER

## 2021-03-15 RX ORDER — FLASH GLUCOSE SENSOR
KIT MISCELLANEOUS
Qty: 2 EACH | Refills: 12 | OUTPATIENT
Start: 2021-03-15

## 2021-04-28 ENCOUNTER — OFFICE VISIT (OUTPATIENT)
Dept: ENDOCRINOLOGY | Facility: CLINIC | Age: 61
End: 2021-04-28
Payer: COMMERCIAL

## 2021-04-28 VITALS
WEIGHT: 193 LBS | DIASTOLIC BLOOD PRESSURE: 88 MMHG | SYSTOLIC BLOOD PRESSURE: 140 MMHG | HEIGHT: 74 IN | HEART RATE: 100 BPM | BODY MASS INDEX: 24.77 KG/M2 | TEMPERATURE: 97 F

## 2021-04-28 DIAGNOSIS — E11.9 TYPE 2 DIABETES MELLITUS WITHOUT COMPLICATION, WITHOUT LONG-TERM CURRENT USE OF INSULIN (HCC): Primary | ICD-10-CM

## 2021-04-28 DIAGNOSIS — E78.2 MIXED HYPERLIPIDEMIA: ICD-10-CM

## 2021-04-28 PROCEDURE — 3079F DIAST BP 80-89 MM HG: CPT | Performed by: PHYSICIAN ASSISTANT

## 2021-04-28 PROCEDURE — 3077F SYST BP >= 140 MM HG: CPT | Performed by: PHYSICIAN ASSISTANT

## 2021-04-28 PROCEDURE — 1036F TOBACCO NON-USER: CPT | Performed by: PHYSICIAN ASSISTANT

## 2021-04-28 PROCEDURE — 99214 OFFICE O/P EST MOD 30 MIN: CPT | Performed by: PHYSICIAN ASSISTANT

## 2021-04-28 PROCEDURE — 3008F BODY MASS INDEX DOCD: CPT | Performed by: PHYSICIAN ASSISTANT

## 2021-04-28 RX ORDER — GLIPIZIDE 5 MG/1
5 TABLET, FILM COATED, EXTENDED RELEASE ORAL DAILY
Qty: 90 TABLET | Refills: 1 | Status: SHIPPED | OUTPATIENT
Start: 2021-04-28 | End: 2021-07-01 | Stop reason: SDUPTHER

## 2021-04-28 NOTE — PATIENT INSTRUCTIONS
Hypoglycemia instructions   Elbert Celis  4/28/2021  [id number]    Low Blood Sugar    Steps to treat low blood sugar  1  Test blood sugar if you have symptoms of low blood sugar:   Low Blood Sugar Symptoms:  o Sweaty  o Dizzy  o Rapid heartbeat  o Shaky  o Bad mood  o Hungry      2  Treat blood sugar less than 70 with 15 grams of fast-acting carbohydrate:   Examples of 15 grams Fast-Acting Carbohydrate:  o 4 oz juice  o 4 oz regular soda  o 3-4 glucose tablets (chew)  o 3-4 hard candies (chew)          3  Wait 15 minutes and test your blood sugar again     4   If blood sugar is less than 100, repeat steps 2-3     5  When your blood sugar is 100 or more, eat a snack if it will be longer than one hour until your next meal  The snack should be 15 grams of carbohydrate and a protein:   Examples of snacks:  o ½ sandwich  o 6 crackers with cheese  o Piece of fruit with cheese or peanut butter  o 6 crackers with peanut butter

## 2021-04-28 NOTE — PROGRESS NOTES
Patient Progress Note      CC: DM      Referring Provider  No referring provider defined for this encounter  History of Present Illness:   Anisha Mathias is a 63 y o  male with a history of type 2 diabetes without long term use of insulin  Diabetes course has been stable  Denies recent illness or hospitalizations  Denies any issues with his current regimen  Home glucose monitoring: are performed regularly, using Freestyle Nya  Average glucose 329 mg/dl with glucose variability of 22 7%  In target range 0%, below range 0%, above range 100%  Readings have been ranging from 200s-500s mg/dl however yesterday most readings were in the 200s range  Current regimen: metformin 500 mg QID  Patient was on glimepiride which was stopped due to occasional episodes of hypoglycemia  However it appears that after stopping glimepiride his readings have drastically increased  compliant most of the time, denies any side effects from medications  Hypoglycemic episodes: No, rare  H/o of hypoglycemia causing hospitalization or Intervention such as glucagon injection  or ambulance call :  No  Hypoglycemia symptoms: dizziness  Treatment of hypoglycemia: candy    Medic alert tag: recommended: Yes    Diabetes education: No  Diet: 3 meals per day, 1-2 snacks per day  Timing of meals is predictable  Diabetic diet compliance:  compliant most of the time  However he has started drinking [place] which he refers to has a diabetic drink  He thinks is 0 or very low carb, but the carb content online notes around 20 grams per can  He will go home and double check  Activity: Daily activity is predictable: Yes  Is active at work  [facility] Op[facility]: per patient eye exam is UTD  Podiatry: March 2020    Not on ACE inhibitor/ARB  Has hyperlipidemia: on statin - tolerating well, no myalgias  compliant most of the time, denies any side effects from medications    Thyroid disorders: No  History of pancreatitis: No    Patient Active Problem List   Diagnosis    Mixed hyperlipidemia    Type 2 diabetes mellitus without complication, without long-term current use of insulin [facility]    Need for hepatitis C screening test      Past Medical History:   Diagnosis Date    [facility] ([facility]       Past Surgical History:   Procedure Laterality Date    FRACTURE SURGERY        Family History   Problem Relation Age of Onset    Diabetes Mother     Diabetes Sister     Diabetes Brother     Pancreatic cancer Sister     Aneurysm Sister         vertebral artery aneurysm     Social History     Tobacco Use    Smoking status: Former Smoker     Types: Cigarettes    Smokeless tobacco: Never Used   Substance Use Topics    Alcohol use: No     Comment: 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)     Allergies   Allergen Reactions    Delsym [Dextromethorphan] Seizures    Epinephrine Other (See Comments)     Had syncopal episode, with possible seizure like activity; had injection for biopsies    Lidocaine Other (See Comments)     Had syncopal episode, with possible seizure like activity; had injection for biopsies         Current Outpatient Medications:     Accu-Chek FastClix Lancets MISC, Use 1 lancet a day to check blood sugar, E11 65, Disp: 100 each, Rfl: 3    aspirin (ECOTRIN LOW STRENGTH) 81 mg EC tablet, Take 81 mg by mouth daily, Disp: , Rfl:     atorvastatin (LIPITOR) 20 mg tablet, Take 1 tablet (20 mg total) by mouth daily, Disp: 90 tablet, Rfl: 3    calcium carbonate (OS-EDIE) 1250 (500 Ca) MG chewable tablet, Chew 2 tablets 2 (two) times a day as needed  , Disp: , Rfl:     Continuous Blood Gluc  (FREESTYLE JESSICA 14 DAY READER) ROSA, Use reader device to check blood sugar, Disp: 1 Device, Rfl: 0    Continuous Blood Gluc Sensor (FreeStyle Jessica 14 Day Sensor) MISC, Use 1 sensor every 14 days to check blood sugar, Disp: 2 each, Rfl: 12    cyanocobalamin (VITAMIN B-12) 100 mcg tablet, Take 1,000 mcg by mouth daily, Disp: , Rfl:     Flaxseed, Linseed, (FLAXSEED OIL) 1000 MG CAPS, Take by mouth, Disp: , Rfl:     glucose 4 g chewable tablet, Chew 4 tablets (16 g total) as needed for low blood sugar, Disp: 100 tablet, Rfl: 1    glucose blood (Accu-Chek Guide) test strip, Use 1 test strip a day to check blood sugar, E11 65, Disp: 100 each, Rfl: 3    glucose blood (Accu-Chek Guide) test strip, Use to test blood sugar 2 times a day, Disp: 100 each, Rfl: 3    ketoconazole (NIZORAL) 2 % cream, Apply to face twice a day when flared, use twice a week when not flared, Disp: 60 g, Rfl: 5    metFORMIN (GLUCOPHAGE) 500 mg tablet, Take 2 tablets (1,000 mg total) by mouth 2 (two) times a day with meals (Patient taking differently: Take 1,000 mg by mouth 2 (two) times a day with meals 4 times a day), Disp: 360 tablet, Rfl: 3    triamcinolone (KENALOG) 0 1 % ointment, Apply topically 2 (two) times a day To rash, Disp: 454 g, Rfl: 0    glimepiride (AMARYL) 1 mg tablet, Take 1 tablet (1 mg total) by mouth daily with breakfast (Patient not taking: Reported on 4/28/2021), Disp: 90 tablet, Rfl: 1  Review of Systems   Constitutional: Negative for activity change, appetite change, fatigue and unexpected weight change  HENT: Negative for trouble swallowing  Eyes: Negative for visual disturbance  Respiratory: Negative for shortness of breath  Cardiovascular: Negative for chest pain and palpitations  Gastrointestinal: Positive for constipation  Negative for diarrhea  Endocrine: Negative for polydipsia and polyuria  Genitourinary: Positive for frequency  Musculoskeletal: Positive for arthralgias (OA)  Skin: Negative for wound  Neurological: Negative for numbness  Psychiatric/Behavioral: Negative  Physical Exam:  Body mass index is 24 78 kg/m²    /88   Pulse 100   Temp (!) 97 °F (36 1 °C) (Tympanic)   Ht 6' 2" (1 88 m)   Wt 87 5 kg (193 lb)   BMI 24 78 kg/m²    Wt Readings from Last 3 Encounters:   04/28/21 87 5 kg (193 lb)   09/28/20 92 5 kg (204 lb)   06/26/20 90 9 kg (200 lb 6 4 oz)       Physical Exam  Vitals signs and nursing note reviewed  Constitutional:       Appearance: He is well-developed  HENT:      Head: Normocephalic  Eyes:      General: No scleral icterus  Pupils: Pupils are equal, round, and reactive to light  Neck:      Musculoskeletal: Neck supple  Thyroid: No thyromegaly  Cardiovascular:      Rate and Rhythm: Normal rate and regular rhythm  Pulses:           Radial pulses are 2+ on the right side and 2+ on the left side  Heart sounds: No murmur  Pulmonary:      Effort: Pulmonary effort is normal  No respiratory distress  Breath sounds: Normal breath sounds  No wheezing  Skin:     General: Skin is warm and dry  Neurological:      Mental Status: He is alert  Diabetic Foot Exam    Labs:   Component      Latest Ref Rng & Units 6/22/2020   Sodium      136 - 145 mmol/L 139   Potassium      3 5 - 5 3 mmol/L 4 0   Chloride      100 - 108 mmol/L 103   CO2      21 - 32 mmol/L 29   Anion Gap      4 - 13 mmol/L 7   BUN      5 - 25 mg/dL 16   Creatinine      0 60 - 1 30 mg/dL 0 92   GLUCOSE FASTING      65 - 99 mg/dL 122 (H)   Calcium      8 3 - 10 1 mg/dL 9 3   AST      5 - 45 U/L 20   ALT      12 - 78 U/L 29   Alkaline Phosphatase      46 - 116 U/L 103   Total Protein      6 4 - 8 2 g/dL 7 9   Albumin      3 5 - 5 0 g/dL 3 9   TOTAL BILIRUBIN      0 20 - 1 00 mg/dL 0 54   eGFR      ml/min/1 73sq m 91   Cholesterol      50 - 200 mg/dL 143   Triglycerides      <=150 mg/dL 54   HDL      >=40 mg/dL 45   LDL Calculated      0 - 100 mg/dL 87   Non-HDL Cholesterol      mg/dl 98   EXT Creatinine Urine      mg/dL 139 0   MICROALBUM ,U,RANDOM      0 0 - 20 0 mg/L 10 2   MICROALBUMIN/CREATININE RATIO      0 - 30 mg/g creatinine 7   Hemoglobin A1C      Normal 3 8-5 6%; PreDiabetic 5 7-6 4%;  Diabetic >=6 5%; Glycemic control for adults with diabetes <7 0% % 6 8 (H)   eAG, EST AVG Glucose      mg/dl 148     Plan:    Diagnoses and all orders for this visit:    Type 2 diabetes mellitus without complication, without long-term current use of insulin ([facility])  HGA1C 6 8%  Due now  Treatment regimen: expecting A1C to be higher as average glucose is in the 300s mg/dl  Yesterday his readings improved to the high 100s -200s range  BG levels nancy after stopping glimepiride  He also admitted to eat junk food in the past due to busy work schedule  He since has improved his diet significantly  Will trial glipizide 5 mg XR  He will call on Monday with update in BG levels  If levels remain very high > 300 mg/dl, we will need to start insulin therapy  Also will check for type 1 DM  Discussed intensive insulin regimen does increase risk for hypoglycemia  Episodes of hypoglycemia can lead to permanent disability and death  Discussed risks/complications associated with uncontrolled diabetes  Advised to adhere to diabetic diet, and recommended staying active/exercising routinely as tolerated  Keep carbohydrates consistent to limit blood glucose fluctuations  Advised to call if blood sugars less than 70 mg/dl or over 300 mg/dl  Check blood glucose 3+ times a day  Discussed symptoms and treatment of hypoglycemia  Discussed use of CGM to collect additional blood glucose data to reveal trends and patterns that can be used to optimize treatment plan  Referred to diabetes/nutrition education  Recommended routine follow-up with podiatry and ophthalmology  Ordered blood work to complete prior to next visit  -     C-peptide; Future  -     Glutamic acid decarboxylase; Future  -     Anti-islet cell antibody; Future    Mixed hyperlipidemia  Continue statin therapy  LDL previously 87         Discussed with the patient diagnosis and treatment and all questions fully answered  He will call me if any problems arise    I have spent 30 minutes with Patient  today in which greater than 50% of this time was spent in counseling/coordination of care regarding Diagnostic results, Risks and benefits of tx options, Intructions for management, Importance of tx compliance and Impressions  Counseled patient on diagnostic results, prognosis, risk and benefit of treatment options, instruction for management, importance of treatment compliance, risk factor reduction and impressions        Maday Gan PA-C

## 2021-05-01 ENCOUNTER — TRANSCRIBE ORDERS (OUTPATIENT)
Dept: LAB | Facility: CLINIC | Age: 61
End: 2021-05-01

## 2021-05-01 ENCOUNTER — APPOINTMENT (OUTPATIENT)
Dept: LAB | Facility: CLINIC | Age: 61
End: 2021-05-01
Payer: COMMERCIAL

## 2021-05-01 DIAGNOSIS — E11.9 TYPE 2 DIABETES MELLITUS WITHOUT COMPLICATION, WITHOUT LONG-TERM CURRENT USE OF INSULIN (HCC): ICD-10-CM

## 2021-05-01 PROCEDURE — 36415 COLL VENOUS BLD VENIPUNCTURE: CPT

## 2021-05-01 PROCEDURE — 86341 ISLET CELL ANTIBODY: CPT

## 2021-05-01 PROCEDURE — 83519 RIA NONANTIBODY: CPT

## 2021-05-01 PROCEDURE — 84681 ASSAY OF C-PEPTIDE: CPT

## 2021-05-02 LAB — C PEPTIDE SERPL-MCNC: 3 NG/ML (ref 1.1–4.4)

## 2021-05-03 DIAGNOSIS — E11.9 TYPE 2 DIABETES MELLITUS WITHOUT COMPLICATION, WITHOUT LONG-TERM CURRENT USE OF INSULIN (HCC): Primary | ICD-10-CM

## 2021-05-03 DIAGNOSIS — E11.65 TYPE 2 DIABETES MELLITUS WITH HYPERGLYCEMIA, WITHOUT LONG-TERM CURRENT USE OF INSULIN (HCC): ICD-10-CM

## 2021-05-03 DIAGNOSIS — E78.00 PURE HYPERCHOLESTEROLEMIA: ICD-10-CM

## 2021-05-03 DIAGNOSIS — I10 HYPERTENSION GOAL BP (BLOOD PRESSURE) < 140/90: ICD-10-CM

## 2021-05-03 LAB
GAD65 AB SER-ACNC: <5 U/ML (ref 0–5)
PANC ISLET CELL AB TITR SER: NEGATIVE {TITER}

## 2021-05-03 RX ORDER — ATORVASTATIN CALCIUM 20 MG/1
20 TABLET, FILM COATED ORAL DAILY
Qty: 90 TABLET | Refills: 1 | Status: SHIPPED | OUTPATIENT
Start: 2021-05-03 | End: 2021-07-01 | Stop reason: SDUPTHER

## 2021-05-03 NOTE — TELEPHONE ENCOUNTER
Pt states he went for his labwork this past Saturday  Labs are still processing  Pt states the glipizide is helping  He does state after meals his levels tend to go up to about 250  But he is also experiecing numbers between 80s-90s  He states he is going to be setting up mychart on Wednesday  He is getting caught up with other things and needs to work the next two days

## 2021-05-03 NOTE — TELEPHONE ENCOUNTER
Its great to see an improvement in his readings  This is much better than his readings previously  I would recommend starting Januvia 100 mg daily to see if it helps with post meal BG levels  We can download the Naylor again in 1-2 weeks

## 2021-05-04 NOTE — TELEPHONE ENCOUNTER
I sent the prescription  Please let patient know his C-peptide is normal which means he is still producing insulin   And his antibodies for type 1 diabetes were negative which means he has type 2 DM

## 2021-07-01 DIAGNOSIS — I10 HYPERTENSION GOAL BP (BLOOD PRESSURE) < 140/90: ICD-10-CM

## 2021-07-01 DIAGNOSIS — E11.65 TYPE 2 DIABETES MELLITUS WITH HYPERGLYCEMIA, WITHOUT LONG-TERM CURRENT USE OF INSULIN (HCC): ICD-10-CM

## 2021-07-01 DIAGNOSIS — E11.9 TYPE 2 DIABETES MELLITUS WITHOUT COMPLICATION, WITHOUT LONG-TERM CURRENT USE OF INSULIN (HCC): ICD-10-CM

## 2021-07-01 DIAGNOSIS — E78.00 PURE HYPERCHOLESTEROLEMIA: ICD-10-CM

## 2021-07-01 RX ORDER — GLIPIZIDE 5 MG/1
5 TABLET, FILM COATED, EXTENDED RELEASE ORAL DAILY
Qty: 90 TABLET | Refills: 1 | Status: SHIPPED | OUTPATIENT
Start: 2021-07-01 | End: 2022-01-04

## 2021-07-01 RX ORDER — ATORVASTATIN CALCIUM 20 MG/1
20 TABLET, FILM COATED ORAL DAILY
Qty: 90 TABLET | Refills: 1 | Status: SHIPPED | OUTPATIENT
Start: 2021-07-01 | End: 2022-01-04

## 2021-07-01 NOTE — TELEPHONE ENCOUNTER
Pt called  He said we need to call his insurance l [phone] to get prior auth for the Naylor  Also he wants to stop taking the [place] and [place]  It went up  To 400  He cannot afford it  I asked him to call and see what would be cheaper and he said he wants to go of it, its not doing anything anyway

## 2021-07-01 NOTE — TELEPHONE ENCOUNTER
Once patient has stopped Januvia  Please ask him to send BG readings in 2 weeks   Based on how his readings are, we will need to decide if other adjustments need to be made to his treatment

## 2021-07-02 DIAGNOSIS — E11.65 TYPE 2 DIABETES MELLITUS WITH HYPERGLYCEMIA, WITHOUT LONG-TERM CURRENT USE OF INSULIN (HCC): ICD-10-CM

## 2021-07-02 RX ORDER — FLASH GLUCOSE SENSOR
KIT MISCELLANEOUS
Qty: 6 EACH | Refills: 1 | Status: SHIPPED | OUTPATIENT
Start: 2021-07-02

## 2021-07-09 ENCOUNTER — TELEPHONE (OUTPATIENT)
Dept: ENDOCRINOLOGY | Facility: CLINIC | Age: 61
End: 2021-07-09

## 2021-07-12 ENCOUNTER — TELEPHONE (OUTPATIENT)
Dept: ENDOCRINOLOGY | Facility: CLINIC | Age: 61
End: 2021-07-12

## 2021-07-12 NOTE — TELEPHONE ENCOUNTER
Received denial from pt's insurance regarding the Dayday  Patient has an appt on 7/19/21 and this will be discussed at appt time

## 2021-07-19 ENCOUNTER — OFFICE VISIT (OUTPATIENT)
Dept: ENDOCRINOLOGY | Facility: CLINIC | Age: 61
End: 2021-07-19
Payer: COMMERCIAL

## 2021-07-19 VITALS
WEIGHT: 198 LBS | HEART RATE: 92 BPM | BODY MASS INDEX: 25.41 KG/M2 | DIASTOLIC BLOOD PRESSURE: 90 MMHG | HEIGHT: 74 IN | TEMPERATURE: 96.6 F | SYSTOLIC BLOOD PRESSURE: 130 MMHG

## 2021-07-19 DIAGNOSIS — E55.9 VITAMIN D DEFICIENCY: ICD-10-CM

## 2021-07-19 DIAGNOSIS — E11.9 TYPE 2 DIABETES MELLITUS WITHOUT COMPLICATION, WITHOUT LONG-TERM CURRENT USE OF INSULIN (HCC): Primary | ICD-10-CM

## 2021-07-19 DIAGNOSIS — E78.2 MIXED HYPERLIPIDEMIA: ICD-10-CM

## 2021-07-19 PROCEDURE — 1036F TOBACCO NON-USER: CPT | Performed by: PHYSICIAN ASSISTANT

## 2021-07-19 PROCEDURE — 99214 OFFICE O/P EST MOD 30 MIN: CPT | Performed by: PHYSICIAN ASSISTANT

## 2021-07-19 PROCEDURE — 3008F BODY MASS INDEX DOCD: CPT | Performed by: PHYSICIAN ASSISTANT

## 2021-07-19 NOTE — PATIENT INSTRUCTIONS
Hypoglycemia instructions   Adan Grove  7/19/2021  [id number]    Low Blood Sugar    Steps to treat low blood sugar  1  Test blood sugar if you have symptoms of low blood sugar:   Low Blood Sugar Symptoms:  o Sweaty  o Dizzy  o Rapid heartbeat  o Shaky  o Bad mood  o Hungry      2  Treat blood sugar less than 70 with 15 grams of fast-acting carbohydrate:   Examples of 15 grams Fast-Acting Carbohydrate:  o 4 oz juice  o 4 oz regular soda  o 3-4 glucose tablets (chew)  o 3-4 hard candies (chew)          3  Wait 15 minutes and test your blood sugar again     4   If blood sugar is less than 100, repeat steps 2-3     5  When your blood sugar is 100 or more, eat a snack if it will be longer than one hour until your next meal  The snack should be 15 grams of carbohydrate and a protein:   Examples of snacks:  o ½ sandwich  o 6 crackers with cheese  o Piece of fruit with cheese or peanut butter  o 6 crackers with peanut butter

## 2021-07-19 NOTE — PROGRESS NOTES
Patient Progress Note      CC: DM      Referring Provider  No referring provider defined for this encounter  History of Present Illness:   To Lobo is a 63 y o  male with a history of type 2 diabetes without long term use of insulin  Diabetes course has been stable  Denies recent illness or hospitalizations  Denies any issues with his current regimen  Home glucose monitoring: are performed sporadically  He was using the [facility] [place] but states its too expensive now  June 6-19th  Average glucose 133 mg/dl with glucose variability of 29 5%  In target range 85%, below range 14%, above range 1%  Current regimen: metformin 1000 mg BID, glipizide 5 mg daily, Januvia 100 mg daily (states he will eventually stop it due to cost)  compliant most of the time, denies any side effects from medications  Hypoglycemic episodes: No, rare  H/o of hypoglycemia causing hospitalization or Intervention such as glucagon injection  or ambulance call :  No  Hypoglycemia symptoms: lightheaded, "weird sensation"  Treatment of hypoglycemia: candy     Medic alert tag: recommended: Yes     Diabetes education: No  Diet: 3 meals per day, 2-3 snacks per day  Timing of meals is predictable  Diabetic diet compliance:  compliant most of the time  Activity: Daily activity is predictable: Yes  Is active at work  Ophthamology: per patient eye exam is UTD, [place]  Podiatry: March 2020     Not on ACE inhibitor/ARB  Has hyperlipidemia: on statin - tolerating well, no myalgias  compliant most of the time, denies any side effects from medications    Thyroid disorders: No  History of pancreatitis: No    Patient Active Problem List   Diagnosis    Mixed hyperlipidemia    Type 2 diabetes mellitus without complication, without long-term current use of insulin ([facility]    Need for hepatitis C screening test    Vitamin D deficiency      Past Medical History:   Diagnosis Date    Diabetes mellitus ([address] )       Past Surgical History:   Procedure Laterality Date    FRACTURE SURGERY        Family History   Problem Relation Age of Onset    Diabetes Mother     Diabetes Sister     Diabetes Brother     Pancreatic cancer Sister     Aneurysm Sister         vertebral artery aneurysm     Social History     Tobacco Use    Smoking status: Former Smoker     Types: Cigarettes    Smokeless tobacco: Never Used   Substance Use Topics    Alcohol use: No     Comment: 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)     Allergies   Allergen Reactions    Delsym [Dextromethorphan] Seizures    Epinephrine Other (See Comments)     Had syncopal episode, with possible seizure like activity; had injection for biopsies    Lidocaine Other (See Comments)     Had syncopal episode, with possible seizure like activity; had injection for biopsies         Current Outpatient Medications:     Accu-Chek FastClix Lancets MISC, Use 1 lancet a day to check blood sugar, E11 65, Disp: 100 each, Rfl: 3    aspirin (ECOTRIN LOW STRENGTH) 81 mg EC tablet, Take 81 mg by mouth daily, Disp: , Rfl:     atorvastatin (LIPITOR) 20 mg tablet, Take 1 tablet (20 mg total) by mouth daily, Disp: 90 tablet, Rfl: 1    calcium carbonate (OS-EDIE) 1250 (500 Ca) MG chewable tablet, Chew 2 tablets 2 (two) times a day as needed  , Disp: , Rfl:     Continuous Blood Gluc  (FREESTYLE JESSICA 14 DAY READER) [place], Use reader device to check blood sugar, Disp: 1 Device, Rfl: 0    Continuous Blood Gluc Sensor (FreeStyle Jessica 14 Day Sensor) MISC, Use 1 sensor every 14 days to check blood sugar, Disp: 6 each, Rfl: 1    cyanocobalamin (VITAMIN B-12) 100 mcg tablet, Take 1,000 mcg by mouth daily, Disp: , Rfl:     Flaxseed, Linseed, (FLAXSEED OIL) 1000 MG CAPS, Take by mouth, Disp: , Rfl:     glipiZIDE (GLUCOTROL XL) 5 mg 24 hr tablet, Take 1 tablet (5 mg total) by mouth daily, Disp: 90 tablet, Rfl: 1    glucose 4 g chewable tablet, Chew 4 tablets (16 g total) as needed for low blood sugar, Disp: 100 tablet, Rfl: 1    glucose blood (Accu-Chek Guide) test strip, Use 1 test strip a day to check blood sugar, E11 65, Disp: 100 each, Rfl: 3    glucose blood (Accu-Chek Guide) test strip, Use to test blood sugar 2 times a day, Disp: 100 each, Rfl: 3    ketoconazole (NIZORAL) 2 % cream, Apply to face twice a day when flared, use twice a week when not flared, Disp: 60 g, Rfl: 5    metFORMIN (GLUCOPHAGE) 500 mg tablet, Take 2 tablets (1,000 mg total) by mouth 2 (two) times a day with meals, Disp: 360 tablet, Rfl: 1    triamcinolone (KENALOG) 0 1 % ointment, Apply topically 2 (two) times a day To rash, Disp: 454 g, Rfl: 0    sitaGLIPtin (JANUVIA) 100 mg tablet, Take 1 tablet (100 mg total) by mouth daily (Patient not taking: Reported on 7/19/2021), Disp: 90 tablet, Rfl: 1  Review of Systems   Constitutional: Negative for activity change, appetite change, fatigue and unexpected weight change  HENT: Negative for trouble swallowing  Eyes: Negative for visual disturbance  Respiratory: Negative for shortness of breath  Cardiovascular: Negative for chest pain and palpitations  Gastrointestinal: Positive for constipation (regulated with Metamucil and Miralax)  Negative for diarrhea  Endocrine: Negative for polydipsia and polyuria  Musculoskeletal: Negative  Skin: Negative for wound  Neurological: Negative for numbness  Psychiatric/Behavioral: Negative  Physical Exam:  Body mass index is 25 42 kg/m²  /90   Pulse 92   Temp (!) 96 6 °F (35 9 °C) (Tympanic)   Ht 6' 2" (1 88 m)   Wt 89 8 kg (198 lb)   BMI 25 42 kg/m²    Wt Readings from Last 3 Encounters:   07/19/21 89 8 kg (198 lb)   04/28/21 87 5 kg (193 lb)   09/28/20 92 5 kg (204 lb)       Physical Exam  Vitals and nursing note reviewed  Constitutional:       Appearance: He is well-developed  HENT:      Head: Normocephalic  Eyes:      General: No scleral icterus       Pupils: Pupils are equal, round, and reactive to light  Neck:      Thyroid: No thyromegaly  Cardiovascular:      Rate and Rhythm: Normal rate and regular rhythm  Pulses: Pulses are weak  Radial pulses are 2+ on the right side and 2+ on the left side  Dorsalis pedis pulses are 1+ on the right side and 1+ on the left side  Heart sounds: No murmur heard  Pulmonary:      Effort: Pulmonary effort is normal  No respiratory distress  Breath sounds: Normal breath sounds  No wheezing  Musculoskeletal:      Cervical back: Neck supple  Feet:      Right foot:      Skin integrity: No ulcer, skin breakdown, erythema, warmth, callus or dry skin  Left foot:      Skin integrity: No ulcer, skin breakdown, erythema, warmth, callus or dry skin  Skin:     General: Skin is warm and dry  Neurological:      Mental Status: He is alert  Patient's shoes and socks removed  Right Foot/Ankle   Right Foot Inspection  Skin Exam: skin normal and skin intact no dry skin, no warmth, no callus, no erythema, no maceration, no abnormal color, no pre-ulcer, no ulcer and no callus                            Sensory   Vibration: diminished    Monofilament testing: diminished  Vascular    The right DP pulse is 1+  Left Foot/Ankle  Left Foot Inspection  Skin Exam: skin normal and skin intactno dry skin, no warmth, no erythema, no maceration, normal color, no pre-ulcer, no ulcer and no callus                                         Sensory   Vibration: diminished    Monofilament: diminished  Vascular    The left DP pulse is 1+  Assign Risk Category:  No deformity present;  Loss of protective sensation; Weak pulses       Risk: 2      Labs:   Component      Latest Ref Rng & Units 12/23/2019 6/22/2020 5/1/2021   Sodium      136 - 145 mmol/L  139    Potassium      3 5 - 5 3 mmol/L  4 0    Chloride      100 - 108 mmol/L  103    CO2      21 - 32 mmol/L  29    Anion Gap      4 - 13 mmol/L  7    BUN      5 - 25 mg/dL  16    Creatinine 0 60 - 1 30 mg/dL  0 92    GLUCOSE FASTING      65 - 99 mg/dL  122 (H)    Calcium      8 3 - 10 1 mg/dL  9 3    AST      5 - 45 U/L  20    ALT      12 - 78 U/L  29    Alkaline Phosphatase      46 - 116 U/L  103    Total Protein      6 4 - 8 2 g/dL  7 9    Albumin      3 5 - 5 0 g/dL  3 9    TOTAL BILIRUBIN      0 20 - 1 00 mg/dL  0 54    eGFR      ml/min/1 73sq m  91    Cholesterol      50 - 200 mg/dL 229 (H) 143    Triglycerides      <=150 mg/dL 88 54    HDL      >=40 mg/dL 44 45    LDL Calculated      0 - 100 mg/dL 167 (H) 87    Non-HDL Cholesterol      mg/dl 185 98    EXT Creatinine Urine      mg/dL 283 0 139 0    MICROALBUM ,U,RANDOM      0 0 - 20 0 mg/L 36 4 (H) 10 2    MICROALBUMIN/CREATININE RATIO      0 - 30 mg/g creatinine 13 7    Hemoglobin A1C      Normal 3 8-5 6%; PreDiabetic 5 7-6 4%; Diabetic >=6 5%; Glycemic control for adults with diabetes <7 0% % 8 3 (H) 6 8 (H)    eAG, EST AVG Glucose      mg/dl 192 148    C-PEPTIDE      1 1 - 4 4 ng/mL   3 0   Glutaminc Acid Decarboxylase 65 Ab      0 0 - 5 0 U/mL   <5 0   ISLET CELL ANTIBODY      Neg:<1:1   Negative       Plan:    Diagnoses and all orders for this visit:    Type 2 diabetes mellitus without complication, without long-term current use of insulin ([facility])  HGA1C 6 8%  Due now  Treatment regimen: advised to complete labs  For now continue current treatment  Patient will not be refilling Januvia once he runs out as it is too expensive even with the copay card  Advised him to then continue glipizide and metformin and send a log 2 weeks after stopping Januvia  Will make changes at that time if necessary  Discussed risks/complications associated with uncontrolled diabetes  Advised to adhere to diabetic diet, and recommended staying active/exercising routinely as tolerated  Keep carbohydrates consistent to limit blood glucose fluctuations  Advised to call if blood sugars less than 70 mg/dl or over 300 mg/dl     Check blood glucose 1-2 times a day  Discussed symptoms and treatment of hypoglycemia  Recommended routine follow-up with podiatry and ophthalmology  Send log in 2 weeks  Ordered blood work to complete now and prior to next visit  -     Hemoglobin A1C; Future  -     Basic metabolic panel; Future  -     Microalbumin / creatinine urine ratio; Future  -     Hemoglobin A1C; Future  -     Basic metabolic panel; Future    Mixed hyperlipidemia  LDL previously 87  Continue statin therapy  -     Lipid panel; Future    Vitamin D deficiency  Check vitamin D level  -     Vitamin D 25 hydroxy; Future       Discussed with the patient diagnosis and treatment and all questions fully answered  He will call me if any problems arise  Counseled patient on diagnostic results, prognosis, risk and benefit of treatment options, instruction for management, importance of treatment compliance, risk factor reduction and impressions        Maday Gan PA-C

## 2021-07-20 ENCOUNTER — APPOINTMENT (OUTPATIENT)
Dept: LAB | Facility: CLINIC | Age: 61
End: 2021-07-20
Payer: COMMERCIAL

## 2021-07-20 ENCOUNTER — TELEPHONE (OUTPATIENT)
Dept: ENDOCRINOLOGY | Facility: CLINIC | Age: 61
End: 2021-07-20

## 2021-07-20 DIAGNOSIS — E55.9 VITAMIN D DEFICIENCY: ICD-10-CM

## 2021-07-20 DIAGNOSIS — E78.2 MIXED HYPERLIPIDEMIA: ICD-10-CM

## 2021-07-20 DIAGNOSIS — E11.9 TYPE 2 DIABETES MELLITUS WITHOUT COMPLICATION, WITHOUT LONG-TERM CURRENT USE OF INSULIN (HCC): ICD-10-CM

## 2021-07-20 LAB
25(OH)D3 SERPL-MCNC: 37.9 NG/ML (ref 30–100)
ANION GAP SERPL CALCULATED.3IONS-SCNC: 7 MMOL/L (ref 4–13)
BUN SERPL-MCNC: 10 MG/DL (ref 5–25)
CALCIUM SERPL-MCNC: 9.8 MG/DL (ref 8.3–10.1)
CHLORIDE SERPL-SCNC: 103 MMOL/L (ref 100–108)
CHOLEST SERPL-MCNC: 144 MG/DL (ref 50–200)
CO2 SERPL-SCNC: 30 MMOL/L (ref 21–32)
CREAT SERPL-MCNC: 0.84 MG/DL (ref 0.6–1.3)
CREAT UR-MCNC: 101 MG/DL
EST. AVERAGE GLUCOSE BLD GHB EST-MCNC: 163 MG/DL
GFR SERPL CREATININE-BSD FRML MDRD: 96 ML/MIN/1.73SQ M
GLUCOSE P FAST SERPL-MCNC: 134 MG/DL (ref 65–99)
HBA1C MFR BLD: 7.3 %
HDLC SERPL-MCNC: 49 MG/DL
LDLC SERPL CALC-MCNC: 81 MG/DL (ref 0–100)
MICROALBUMIN UR-MCNC: 11.1 MG/L (ref 0–20)
MICROALBUMIN/CREAT 24H UR: 11 MG/G CREATININE (ref 0–30)
NONHDLC SERPL-MCNC: 95 MG/DL
POTASSIUM SERPL-SCNC: 4.7 MMOL/L (ref 3.5–5.3)
SODIUM SERPL-SCNC: 140 MMOL/L (ref 136–145)
TRIGL SERPL-MCNC: 72 MG/DL

## 2021-07-20 PROCEDURE — 82570 ASSAY OF URINE CREATININE: CPT

## 2021-07-20 PROCEDURE — 82043 UR ALBUMIN QUANTITATIVE: CPT

## 2021-07-20 PROCEDURE — 83036 HEMOGLOBIN GLYCOSYLATED A1C: CPT

## 2021-07-20 PROCEDURE — 3051F HG A1C>EQUAL 7.0%<8.0%: CPT | Performed by: PHYSICIAN ASSISTANT

## 2021-07-20 PROCEDURE — 3061F NEG MICROALBUMINURIA REV: CPT | Performed by: PHYSICIAN ASSISTANT

## 2021-07-20 PROCEDURE — 80048 BASIC METABOLIC PNL TOTAL CA: CPT

## 2021-07-20 PROCEDURE — 82306 VITAMIN D 25 HYDROXY: CPT

## 2021-07-20 PROCEDURE — 80061 LIPID PANEL: CPT

## 2021-07-20 PROCEDURE — 36415 COLL VENOUS BLD VENIPUNCTURE: CPT

## 2021-07-20 NOTE — TELEPHONE ENCOUNTER
----- Message from Hedy Ham PA-C sent at 7/20/2021  2:17 PM EDT -----  Please call the patient regarding his abnormal result  A1C is 7 3%  continue current treatment  Advised to follow-up as discussed during OV today  Send log 2 weeks after he runs out of Januvia     Urine microalbumin is normal   Vit D is normal, continue current supplementation  Kidney functions are normal

## 2021-11-15 ENCOUNTER — NURSE TRIAGE (OUTPATIENT)
Dept: OTHER | Facility: OTHER | Age: 61
End: 2021-11-15

## 2021-11-15 DIAGNOSIS — Z20.822 SUSPECTED SEVERE ACUTE RESPIRATORY SYNDROME CORONAVIRUS 2 (SARS-COV-2) INFECTION: Primary | ICD-10-CM

## 2021-11-15 PROCEDURE — U0005 INFEC AGEN DETEC AMPLI PROBE: HCPCS | Performed by: FAMILY MEDICINE

## 2021-11-15 PROCEDURE — U0003 INFECTIOUS AGENT DETECTION BY NUCLEIC ACID (DNA OR RNA); SEVERE ACUTE RESPIRATORY SYNDROME CORONAVIRUS 2 (SARS-COV-2) (CORONAVIRUS DISEASE [COVID-19]), AMPLIFIED PROBE TECHNIQUE, MAKING USE OF HIGH THROUGHPUT TECHNOLOGIES AS DESCRIBED BY CMS-2020-01-R: HCPCS | Performed by: FAMILY MEDICINE

## 2021-11-16 ENCOUNTER — TELEPHONE (OUTPATIENT)
Dept: OTHER | Facility: OTHER | Age: 61
End: 2021-11-16

## 2021-11-16 LAB — SARS-COV-2 RNA RESP QL NAA+PROBE: POSITIVE

## 2021-11-20 ENCOUNTER — NURSE TRIAGE (OUTPATIENT)
Dept: OTHER | Facility: OTHER | Age: 61
End: 2021-11-20

## 2021-11-30 DIAGNOSIS — R21 RASH: ICD-10-CM

## 2021-11-30 DIAGNOSIS — E11.65 TYPE 2 DIABETES MELLITUS WITH HYPERGLYCEMIA, WITHOUT LONG-TERM CURRENT USE OF INSULIN (HCC): ICD-10-CM

## 2021-11-30 DIAGNOSIS — E78.00 PURE HYPERCHOLESTEROLEMIA: ICD-10-CM

## 2021-11-30 DIAGNOSIS — E66.3 OVERWEIGHT (BMI 25.0-29.9): ICD-10-CM

## 2021-11-30 RX ORDER — BLOOD SUGAR DIAGNOSTIC
STRIP MISCELLANEOUS
Qty: 100 EACH | Refills: 3 | Status: SHIPPED | OUTPATIENT
Start: 2021-11-30 | End: 2021-12-01 | Stop reason: SDUPTHER

## 2021-11-30 RX ORDER — LANCETS
EACH MISCELLANEOUS
Qty: 100 EACH | Refills: 3 | Status: SHIPPED | OUTPATIENT
Start: 2021-11-30 | End: 2021-12-01 | Stop reason: SDUPTHER

## 2021-12-01 DIAGNOSIS — E78.00 PURE HYPERCHOLESTEROLEMIA: ICD-10-CM

## 2021-12-01 DIAGNOSIS — E66.3 OVERWEIGHT (BMI 25.0-29.9): ICD-10-CM

## 2021-12-01 DIAGNOSIS — R21 RASH: ICD-10-CM

## 2021-12-01 DIAGNOSIS — E11.65 TYPE 2 DIABETES MELLITUS WITH HYPERGLYCEMIA, WITHOUT LONG-TERM CURRENT USE OF INSULIN (HCC): ICD-10-CM

## 2021-12-01 RX ORDER — BLOOD SUGAR DIAGNOSTIC
STRIP MISCELLANEOUS
Qty: 100 EACH | Refills: 3 | Status: SHIPPED | OUTPATIENT
Start: 2021-12-01

## 2021-12-01 RX ORDER — LANCETS
EACH MISCELLANEOUS
Qty: 100 EACH | Refills: 3 | Status: SHIPPED | OUTPATIENT
Start: 2021-12-01

## 2021-12-03 ENCOUNTER — TELEPHONE (OUTPATIENT)
Dept: ENDOCRINOLOGY | Facility: CLINIC | Age: 61
End: 2021-12-03

## 2021-12-04 ENCOUNTER — LAB (OUTPATIENT)
Dept: LAB | Facility: CLINIC | Age: 61
End: 2021-12-04
Payer: COMMERCIAL

## 2021-12-04 DIAGNOSIS — E11.9 TYPE 2 DIABETES MELLITUS WITHOUT COMPLICATION, WITHOUT LONG-TERM CURRENT USE OF INSULIN (HCC): ICD-10-CM

## 2021-12-04 LAB
ANION GAP SERPL CALCULATED.3IONS-SCNC: 10 MMOL/L (ref 4–13)
BUN SERPL-MCNC: 15 MG/DL (ref 5–25)
CALCIUM SERPL-MCNC: 9.2 MG/DL (ref 8.3–10.1)
CHLORIDE SERPL-SCNC: 101 MMOL/L (ref 100–108)
CO2 SERPL-SCNC: 28 MMOL/L (ref 21–32)
CREAT SERPL-MCNC: 1.05 MG/DL (ref 0.6–1.3)
EST. AVERAGE GLUCOSE BLD GHB EST-MCNC: 177 MG/DL
GFR SERPL CREATININE-BSD FRML MDRD: 77 ML/MIN/1.73SQ M
GLUCOSE SERPL-MCNC: 226 MG/DL (ref 65–140)
HBA1C MFR BLD: 7.8 %
POTASSIUM SERPL-SCNC: 4.1 MMOL/L (ref 3.5–5.3)
SODIUM SERPL-SCNC: 139 MMOL/L (ref 136–145)

## 2021-12-04 PROCEDURE — 36415 COLL VENOUS BLD VENIPUNCTURE: CPT

## 2021-12-04 PROCEDURE — 83036 HEMOGLOBIN GLYCOSYLATED A1C: CPT

## 2021-12-04 PROCEDURE — 80048 BASIC METABOLIC PNL TOTAL CA: CPT

## 2021-12-06 ENCOUNTER — TELEPHONE (OUTPATIENT)
Dept: ENDOCRINOLOGY | Facility: CLINIC | Age: 61
End: 2021-12-06

## 2022-04-02 DIAGNOSIS — E11.9 TYPE 2 DIABETES MELLITUS WITHOUT COMPLICATION, WITHOUT LONG-TERM CURRENT USE OF INSULIN (HCC): ICD-10-CM

## 2022-04-02 DIAGNOSIS — I10 HYPERTENSION GOAL BP (BLOOD PRESSURE) < 140/90: ICD-10-CM

## 2022-04-02 DIAGNOSIS — E78.00 PURE HYPERCHOLESTEROLEMIA: ICD-10-CM

## 2022-04-02 DIAGNOSIS — E11.65 TYPE 2 DIABETES MELLITUS WITH HYPERGLYCEMIA, WITHOUT LONG-TERM CURRENT USE OF INSULIN (HCC): ICD-10-CM

## 2022-04-03 RX ORDER — GLIPIZIDE 5 MG/1
TABLET, FILM COATED, EXTENDED RELEASE ORAL
Qty: 90 TABLET | Refills: 0 | Status: SHIPPED | OUTPATIENT
Start: 2022-04-03 | End: 2022-07-01

## 2022-04-03 RX ORDER — ATORVASTATIN CALCIUM 20 MG/1
TABLET, FILM COATED ORAL
Qty: 90 TABLET | Refills: 0 | Status: SHIPPED | OUTPATIENT
Start: 2022-04-03 | End: 2022-07-01

## 2022-07-01 DIAGNOSIS — E78.00 PURE HYPERCHOLESTEROLEMIA: ICD-10-CM

## 2022-07-01 DIAGNOSIS — I10 HYPERTENSION GOAL BP (BLOOD PRESSURE) < 140/90: ICD-10-CM

## 2022-07-01 DIAGNOSIS — E11.9 TYPE 2 DIABETES MELLITUS WITHOUT COMPLICATION, WITHOUT LONG-TERM CURRENT USE OF INSULIN (HCC): ICD-10-CM

## 2022-07-01 DIAGNOSIS — E11.65 TYPE 2 DIABETES MELLITUS WITH HYPERGLYCEMIA, WITHOUT LONG-TERM CURRENT USE OF INSULIN (HCC): ICD-10-CM

## 2022-07-01 RX ORDER — GLIPIZIDE 5 MG/1
TABLET, FILM COATED, EXTENDED RELEASE ORAL
Qty: 90 TABLET | Refills: 0 | Status: SHIPPED | OUTPATIENT
Start: 2022-07-01 | End: 2022-09-29

## 2022-07-01 RX ORDER — ATORVASTATIN CALCIUM 20 MG/1
TABLET, FILM COATED ORAL
Qty: 90 TABLET | Refills: 0 | Status: SHIPPED | OUTPATIENT
Start: 2022-07-01 | End: 2022-09-29

## 2022-07-09 ENCOUNTER — APPOINTMENT (EMERGENCY)
Dept: ULTRASOUND IMAGING | Facility: HOSPITAL | Age: 62
End: 2022-07-09
Payer: COMMERCIAL

## 2022-07-09 ENCOUNTER — HOSPITAL ENCOUNTER (EMERGENCY)
Facility: HOSPITAL | Age: 62
Discharge: HOME | End: 2022-07-09
Attending: EMERGENCY MEDICINE
Payer: COMMERCIAL

## 2022-07-09 VITALS
DIASTOLIC BLOOD PRESSURE: 66 MMHG | WEIGHT: 197.97 LBS | OXYGEN SATURATION: 96 % | SYSTOLIC BLOOD PRESSURE: 131 MMHG | BODY MASS INDEX: 25.42 KG/M2 | HEART RATE: 62 BPM | TEMPERATURE: 98.3 F | RESPIRATION RATE: 16 BRPM

## 2022-07-09 DIAGNOSIS — B35.6 TINEA CRURIS: Primary | ICD-10-CM

## 2022-07-09 DIAGNOSIS — R73.09 ELEVATED GLUCOSE: ICD-10-CM

## 2022-07-09 DIAGNOSIS — N44.2 TESTICULAR CYST: ICD-10-CM

## 2022-07-09 DIAGNOSIS — N43.3 HYDROCELE: ICD-10-CM

## 2022-07-09 DIAGNOSIS — N50.819 TESTICULAR PAIN: ICD-10-CM

## 2022-07-09 LAB
ALBUMIN SERPL BCP-MCNC: 4.3 G/DL (ref 3.5–5)
ALP SERPL-CCNC: 89 U/L (ref 34–104)
ALT SERPL W P-5'-P-CCNC: 23 U/L (ref 7–52)
ANION GAP SERPL CALCULATED.3IONS-SCNC: 5 MMOL/L (ref 4–13)
AST SERPL W P-5'-P-CCNC: 18 U/L (ref 13–39)
BACTERIA UR QL AUTO: NORMAL /HPF
BASOPHILS # BLD AUTO: 0.06 THOUSANDS/ΜL (ref 0–0.1)
BASOPHILS NFR BLD AUTO: 1 % (ref 0–1)
BILIRUB SERPL-MCNC: 0.53 MG/DL (ref 0.2–1)
BILIRUB UR QL STRIP: NEGATIVE
BILIRUB UR QL STRIP: NEGATIVE
BUN SERPL-MCNC: 13 MG/DL (ref 5–25)
CALCIUM SERPL-MCNC: 9.9 MG/DL (ref 8.4–10.2)
CHLORIDE SERPL-SCNC: 99 MMOL/L (ref 96–108)
CLARITY UR: CLEAR
CLARITY UR: CLEAR
CO2 SERPL-SCNC: 31 MMOL/L (ref 21–32)
COLOR UR: YELLOW
COLOR UR: YELLOW
CREAT SERPL-MCNC: 0.85 MG/DL (ref 0.6–1.3)
EOSINOPHIL # BLD AUTO: 0.56 THOUSAND/ΜL (ref 0–0.61)
EOSINOPHIL NFR BLD AUTO: 7 % (ref 0–6)
ERYTHROCYTE [DISTWIDTH] IN BLOOD BY AUTOMATED COUNT: 13.1 % (ref 11.6–15.1)
GFR SERPL CREATININE-BSD FRML MDRD: 94 ML/MIN/1.73SQ M
GLUCOSE SERPL-MCNC: 293 MG/DL (ref 65–140)
GLUCOSE UR STRIP-MCNC: ABNORMAL MG/DL
GLUCOSE UR STRIP-MCNC: ABNORMAL MG/DL
HCT VFR BLD AUTO: 40.4 % (ref 36.5–49.3)
HGB BLD-MCNC: 13.8 G/DL (ref 12–17)
HGB UR QL STRIP.AUTO: NEGATIVE
HGB UR QL STRIP.AUTO: NEGATIVE
IMM GRANULOCYTES # BLD AUTO: 0.02 THOUSAND/UL (ref 0–0.2)
IMM GRANULOCYTES NFR BLD AUTO: 0 % (ref 0–2)
KETONES UR STRIP-MCNC: NEGATIVE MG/DL
KETONES UR STRIP-MCNC: NEGATIVE MG/DL
LEUKOCYTE ESTERASE UR QL STRIP: ABNORMAL
LEUKOCYTE ESTERASE UR QL STRIP: NEGATIVE
LYMPHOCYTES # BLD AUTO: 2.37 THOUSANDS/ΜL (ref 0.6–4.47)
LYMPHOCYTES NFR BLD AUTO: 30 % (ref 14–44)
MCH RBC QN AUTO: 30.9 PG (ref 26.8–34.3)
MCHC RBC AUTO-ENTMCNC: 34.2 G/DL (ref 31.4–37.4)
MCV RBC AUTO: 91 FL (ref 82–98)
MONOCYTES # BLD AUTO: 1.02 THOUSAND/ΜL (ref 0.17–1.22)
MONOCYTES NFR BLD AUTO: 13 % (ref 4–12)
NEUTROPHILS # BLD AUTO: 3.89 THOUSANDS/ΜL (ref 1.85–7.62)
NEUTS SEG NFR BLD AUTO: 49 % (ref 43–75)
NITRITE UR QL STRIP: NEGATIVE
NITRITE UR QL STRIP: NEGATIVE
NON-SQ EPI CELLS URNS QL MICRO: NORMAL /HPF
NRBC BLD AUTO-RTO: 0 /100 WBCS
PH UR STRIP.AUTO: 7 [PH] (ref 4.5–8)
PH UR STRIP.AUTO: 7.5 [PH]
PLATELET # BLD AUTO: 256 THOUSANDS/UL (ref 149–390)
PMV BLD AUTO: 9.5 FL (ref 8.9–12.7)
POTASSIUM SERPL-SCNC: 4.5 MMOL/L (ref 3.5–5.3)
PROT SERPL-MCNC: 7.5 G/DL (ref 6.4–8.4)
PROT UR STRIP-MCNC: NEGATIVE MG/DL
PROT UR STRIP-MCNC: NEGATIVE MG/DL
RBC # BLD AUTO: 4.46 MILLION/UL (ref 3.88–5.62)
RBC #/AREA URNS AUTO: NORMAL /HPF
SODIUM SERPL-SCNC: 135 MMOL/L (ref 135–147)
SP GR UR STRIP.AUTO: 1.01 (ref 1–1.03)
SP GR UR STRIP.AUTO: 1.01 (ref 1–1.03)
UROBILINOGEN UR QL STRIP.AUTO: 0.2 E.U./DL
UROBILINOGEN UR QL STRIP.AUTO: 0.2 E.U./DL
WBC # BLD AUTO: 7.92 THOUSAND/UL (ref 4.31–10.16)
WBC #/AREA URNS AUTO: NORMAL /HPF

## 2022-07-09 PROCEDURE — 85025 COMPLETE CBC W/AUTO DIFF WBC: CPT | Performed by: PHYSICIAN ASSISTANT

## 2022-07-09 PROCEDURE — 81003 URINALYSIS AUTO W/O SCOPE: CPT

## 2022-07-09 PROCEDURE — 99284 EMERGENCY DEPT VISIT MOD MDM: CPT

## 2022-07-09 PROCEDURE — 76870 US EXAM SCROTUM: CPT

## 2022-07-09 PROCEDURE — 80053 COMPREHEN METABOLIC PANEL: CPT | Performed by: PHYSICIAN ASSISTANT

## 2022-07-09 PROCEDURE — 36415 COLL VENOUS BLD VENIPUNCTURE: CPT | Performed by: PHYSICIAN ASSISTANT

## 2022-07-09 PROCEDURE — 81001 URINALYSIS AUTO W/SCOPE: CPT | Performed by: PHYSICIAN ASSISTANT

## 2022-07-09 PROCEDURE — 99282 EMERGENCY DEPT VISIT SF MDM: CPT | Performed by: PHYSICIAN ASSISTANT

## 2022-07-09 RX ORDER — CLOTRIMAZOLE AND BETAMETHASONE DIPROPIONATE 10; .64 MG/G; MG/G
CREAM TOPICAL
Qty: 15 G | Refills: 0 | Status: SHIPPED | OUTPATIENT
Start: 2022-07-09

## 2022-07-09 NOTE — DISCHARGE INSTRUCTIONS
Medicationhydroce as directed  Keep area dry  FU with your doctor  FU with urology   Return to the ED for worsening symptoms

## 2022-07-09 NOTE — ED PROVIDER NOTES
History  Chief Complaint   Patient presents with    Testicle Pain     Pt states to have testicle pain since Thursday as well as rash noted to left testicular area      Patient presents emergency department with testicle pain over the past 3 days  He also rash has been using Lotrimin  Patient was concerned with the ongoing discomfort  He states that it is more of just pressure  Did have some increasing pain with lifting but did not feel a bulge or any hernias  Urinary symptoms  Patient sexually active at time  No penile discharge or dysuria  No abdominal pain GI upset  No cough congestion fevers or chest pain  Prior to Admission Medications   Prescriptions Last Dose Informant Patient Reported? Taking?    Accu-Chek FastClix Lancets MISC   No No   Sig: Use 1 lancet a day to check blood sugar, E11 65   Continuous Blood Gluc  (FREESTYLE JESSICA 14 DAY READER) [place]   No No   Sig: Use reader device to check blood sugar   Continuous Blood Gluc Sensor (FreeStyle Jessica 14 Day Sensor) [facility]   No No   Sig: Use 1 sensor every 14 days to check blood sugar   Flaxseed, Linseed, (FLAXSEED OIL) 1000 MG CAPS   Yes No   Sig: Take by mouth   aspirin (ECOTRIN LOW STRENGTH) 81 mg EC tablet  Self Yes No   Sig: Take 81 mg by mouth daily   atorvastatin (LIPITOR) 20 mg tablet   No No   Sig: TAKE 1 TABLET DAILY   calcium carbonate (OS-EDIE) 1250 (500 Ca) MG chewable tablet  Self Yes No   Sig: Chew 2 tablets 2 (two) times a day as needed     cyanocobalamin (VITAMIN B-12) 100 mcg tablet   Yes No   Sig: Take 1,000 mcg by mouth daily   glipiZIDE (GLUCOTROL XL) 5 mg 24 hr tablet   No No   Sig: TAKE 1 TABLET DAILY   glucose 4 g chewable tablet   No No   Sig: Chew 4 tablets (16 g total) as needed for low blood sugar   glucose blood (Accu-Chek Guide) test strip   No No   Sig: Use 1 test strip a day to check blood sugar, E11 65   guaiFENesin (MUCINEX) 600 mg 12 hr tablet   Yes No   Sig: Take 1,200 mg by mouth every 12 (twelve) hours   ketoconazole (NIZORAL) 2 % cream   No No   Sig: Apply to face twice a day when flared, use twice a week when not flared   metFORMIN (GLUCOPHAGE) 500 mg tablet   No No   Sig: TAKE 2 TABLETS TWO TIMES A DAY WITH MEALS   sitaGLIPtin (JANUVIA) 100 mg tablet   No No   Sig: Take 1 tablet (100 mg total) by mouth daily   Patient not taking: Reported on 7/19/2021   triamcinolone (KENALOG) 0 1 % ointment   No No   Sig: Apply topically 2 (two) times a day To rash      Facility-Administered Medications: None       Past Medical History:   Diagnosis Date    Diabetes mellitus ([facility] )        Past Surgical History:   Procedure Laterality Date    FRACTURE SURGERY         Family History   Problem Relation Age of Onset    Diabetes Mother     Diabetes Sister     Diabetes Brother     Pancreatic cancer Sister     Aneurysm Sister         vertebral artery aneurysm     I have reviewed and agree with the history as documented  E-Cigarette/Vaping    E-Cigarette Use Never User      E-Cigarette/Vaping Substances     Social History     Tobacco Use    Smoking status: Former Smoker     Types: Cigarettes    Smokeless tobacco: Never Used   Vaping Use    Vaping Use: Never used   Substance Use Topics    Alcohol use: No     Comment: 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)    Drug use: No       Review of Systems   All other systems reviewed and are negative  Physical Exam  Physical Exam  Vitals and nursing note reviewed  Exam conducted with a chaperone present  Constitutional:       Appearance: He is well-developed  HENT:      Head: Normocephalic and atraumatic  Right Ear: External ear normal       Left Ear: External ear normal    Eyes:      Conjunctiva/sclera: Conjunctivae normal    Cardiovascular:      Rate and Rhythm: Normal rate and regular rhythm  Heart sounds: Normal heart sounds  Pulmonary:      Effort: Pulmonary effort is normal       Breath sounds: Normal breath sounds     Abdominal: General: Bowel sounds are normal       Palpations: Abdomen is soft  Genitourinary:     Penis: Circumcised  Testes:         Left: Tenderness present  Mass or swelling not present  Left testis is descended  Musculoskeletal:         General: Normal range of motion  Cervical back: Normal range of motion and neck supple  Lymphadenopathy:      Cervical: No cervical adenopathy  Skin:     General: Skin is warm  Findings: Rash present  Neurological:      Mental Status: He is alert and oriented to person, place, and time  Motor: No abnormal muscle tone        Coordination: Coordination normal    Psychiatric:         Behavior: Behavior normal          Vital Signs  ED Triage Vitals   Temperature Pulse Respirations Blood Pressure SpO2   07/09/22 1229 07/09/22 1229 07/09/22 1229 07/09/22 1229 07/09/22 1229   98 3 °F (36 8 °C) 67 16 120/70 98 %      Temp src Heart Rate Source Patient Position - Orthostatic VS BP Location FiO2 (%)   -- 07/09/22 1434 07/09/22 1229 07/09/22 1229 --    Monitor Sitting Left arm       Pain Score       07/09/22 1229       4           Vitals:    07/09/22 1229 07/09/22 1434   BP: 120/70 131/66   Pulse: 67 67   Patient Position - Orthostatic VS: Sitting Lying         Visual Acuity      ED Medications  Medications - No data to display    Diagnostic Studies  Results Reviewed     Procedure Component Value Units Date/Time    Comprehensive metabolic panel [[id number]]  (Abnormal) Collected: 07/09/22 1327    Lab Status: Final result Specimen: Blood from Arm, Right Updated: 07/09/22 1406     Sodium 135 mmol/L      Potassium 4 5 mmol/L      Chloride 99 mmol/L      CO2 31 mmol/L      ANION GAP 5 mmol/L      BUN 13 mg/dL      Creatinine 0 85 mg/dL      Glucose 293 mg/dL      Calcium 9 9 mg/dL      AST 18 U/L      ALT 23 U/L      Alkaline Phosphatase 89 U/L      Total Protein 7 5 g/dL      Albumin 4 3 g/dL      Total Bilirubin 0 53 mg/dL      eGFR 94 ml/min/1 73sq m     Narrative: [place] guidelines for Chronic Kidney Disease (CKD):     Stage 1 with normal or high GFR (GFR > 90 mL/min/1 73 square meters)    Stage 2 Mild CKD (GFR = 60-89 mL/min/1 73 square meters)    Stage 3A Moderate CKD (GFR = 45-59 mL/min/1 73 square meters)    Stage 3B Moderate CKD (GFR = 30-44 mL/min/1 73 square meters)    Stage 4 Severe CKD (GFR = 15-29 mL/min/1 73 square meters)    Stage 5 End Stage CKD (GFR <15 mL/min/1 73 square meters)  Note: GFR calculation is accurate only with a steady state creatinine    Urine Microscopic [[id number]]  (Normal) Collected: 07/09/22 1328    Lab Status: Final result Specimen: Urine, Clean Catch Updated: 07/09/22 1348     RBC, UA None Seen /hpf      WBC, UA 0-1 /hpf      Epithelial Cells Occasional /hpf      Bacteria, UA Occasional /hpf     UA w Reflex to Microscopic w Reflex to Culture [[id number]]  (Abnormal) Collected: 07/09/22 1328    Lab Status: Final result Specimen: Urine, Clean Catch Updated: 07/09/22 1340     Color, UA Yellow     Clarity, UA Clear     Specific Gravity, UA 1 010     pH, UA 7 5     Leukocytes, UA Elevated glucose may cause decreased leukocyte values   See urine microscopic for [facility] result/     Nitrite, UA Negative     Protein, UA Negative mg/dl      Glucose, UA >=1000 (1%) mg/dl      Ketones, UA Negative mg/dl      Urobilinogen, UA 0 2 E U /dl      Bilirubin, UA Negative     Occult Blood, UA Negative    CBC and differential [[id number]]  (Abnormal) Collected: 07/09/22 1327    Lab Status: Final result Specimen: Blood from Arm, Right Updated: 07/09/22 1338     WBC 7 92 Thousand/uL      RBC 4 46 Million/uL      Hemoglobin 13 8 g/dL      Hematocrit 40 4 %      MCV 91 fL      MCH 30 9 pg      MCHC 34 2 g/dL      RDW 13 1 %      MPV 9 5 fL      Platelets 515 Thousands/uL      nRBC 0 /100 WBCs      Neutrophils Relative 49 %      Immat GRANS % 0 %      Lymphocytes Relative 30 %      Monocytes Relative 13 %      Eosinophils Relative 7 % Basophils Relative 1 %      Neutrophils Absolute 3 89 Thousands/µL      Immature Grans Absolute 0 02 Thousand/uL      Lymphocytes Absolute 2 37 Thousands/µL      Monocytes Absolute 1 02 Thousand/µL      Eosinophils Absolute 0 56 Thousand/µL      Basophils Absolute 0 06 Thousands/µL     Urine Macroscopic, POC [[id number]]  (Abnormal) Collected: 07/09/22 1317    Lab Status: Final result Specimen: Urine Updated: 07/09/22 1319     Color, UA Yellow     Clarity, UA Clear     pH, UA 7 0     Leukocytes, UA Negative     Nitrite, UA Negative     Protein, UA Negative mg/dl      Glucose,  (1/2%) mg/dl      Ketones, UA Negative mg/dl      Urobilinogen, UA 0 2 E U /dl      Bilirubin, UA Negative     Occult Blood, UA Negative     Specific Gravity, UA 1 015    Narrative:      CLINITEK RESULT                 US scrotum and testicles   Final Result by Ewa Renteria MD (07/09 1426)       Small minimally complex left hydrocele  Tiny bilateral epididymal head cysts, the larger of which measures 2 mm on the left  No suspicious epididymal mass  2 mm left intratesticular cyst  No suspicious testicular mass  Normal Doppler flow to both testicles  Workstation performed: [id number]                    Procedures  Procedures         ED Course  ED Course as of 07/09/22 1447   Sat Jul 09, 2022   1443 Discussed result and plan with pt                                             MDM  Number of Diagnoses or Management Options  Elevated glucose: established and worsening  Hydrocele: new and requires workup  Testicular cyst: new and requires workup  Testicular pain: new and requires workup  Tinea cruris: new and requires workup  Risk of Complications, Morbidity, and/or Mortality  General comments: Instructions reviewed w pt       Patient Progress  Patient progress: improved      Disposition  Final diagnoses:   Tinea cruris   Elevated glucose   Testicular pain   Hydrocele   Testicular cyst     Time reflects when diagnosis was documented in both MDM as applicable and the Disposition within this note     Time User Action Codes Description Comment    7/9/2022  2:43 PM Sarah Louis [B35 6] Tinea cruris     7/9/2022  2:43 PM Sarah Louis [R73 09] Elevated glucose     7/9/2022  2:44 PM Sarah Louis [N50 819] Testicular pain     7/9/2022  2:45 PM Sarah Louis [N43 3] Hydrocele     7/9/2022  2:46 PM Clora JeanetteSarah velazco Add [N44 2] Testicular cyst       ED Disposition     ED Disposition   Discharge    Condition   Stable    Date/Time   Sat Jul 9, 2022  2:43 PM    Comment   Kayce Conrad Quick discharge to home/self care  Follow-up Information     Follow up With Specialties Details Why Contact Info Additional [address] MD Internal Medicine   [address] [facility]   [place] [id number]  [phone] [facility], [address] [place] [address], [facility]          Patient's Medications   Discharge Prescriptions    CLOTRIMAZOLE-BETAMETHASONE (Michaela Camera) 1-0 05 % CREAM    Apply to affected area 2 times daily prn       Start Date: 7/9/2022  End Date: --       Order Dose: --       Quantity: 15 g    Refills: 0       No discharge procedures on file      PDMP Review     None          ED Provider  Electronically Signed by           Raina Cornell PA-C  07/09/22 2688

## 2022-09-29 DIAGNOSIS — E78.00 PURE HYPERCHOLESTEROLEMIA: ICD-10-CM

## 2022-09-29 DIAGNOSIS — E11.65 TYPE 2 DIABETES MELLITUS WITH HYPERGLYCEMIA, WITHOUT LONG-TERM CURRENT USE OF INSULIN (HCC): ICD-10-CM

## 2022-09-29 DIAGNOSIS — E11.9 TYPE 2 DIABETES MELLITUS WITHOUT COMPLICATION, WITHOUT LONG-TERM CURRENT USE OF INSULIN (HCC): ICD-10-CM

## 2022-09-29 DIAGNOSIS — I10 HYPERTENSION GOAL BP (BLOOD PRESSURE) < 140/90: ICD-10-CM

## 2022-09-29 RX ORDER — ATORVASTATIN CALCIUM 20 MG/1
TABLET, FILM COATED ORAL
Qty: 90 TABLET | Refills: 0 | Status: SHIPPED | OUTPATIENT
Start: 2022-09-29

## 2022-09-29 RX ORDER — GLIPIZIDE 5 MG/1
TABLET, FILM COATED, EXTENDED RELEASE ORAL
Qty: 90 TABLET | Refills: 0 | Status: SHIPPED | OUTPATIENT
Start: 2022-09-29

## 2022-11-13 DIAGNOSIS — R21 RASH: ICD-10-CM

## 2022-11-13 DIAGNOSIS — E78.00 PURE HYPERCHOLESTEROLEMIA: ICD-10-CM

## 2022-11-13 DIAGNOSIS — E11.65 TYPE 2 DIABETES MELLITUS WITH HYPERGLYCEMIA, WITHOUT LONG-TERM CURRENT USE OF INSULIN (HCC): ICD-10-CM

## 2022-11-13 DIAGNOSIS — E66.3 OVERWEIGHT (BMI 25.0-29.9): ICD-10-CM

## 2022-11-14 RX ORDER — BLOOD SUGAR DIAGNOSTIC
STRIP MISCELLANEOUS
Qty: 100 STRIP | Refills: 3 | OUTPATIENT
Start: 2022-11-14

## 2022-11-14 RX ORDER — LANCETS
EACH MISCELLANEOUS
Qty: 102 EACH | Refills: 3 | OUTPATIENT
Start: 2022-11-14

## 2022-12-21 DIAGNOSIS — E78.00 PURE HYPERCHOLESTEROLEMIA: ICD-10-CM

## 2022-12-21 DIAGNOSIS — E11.65 TYPE 2 DIABETES MELLITUS WITH HYPERGLYCEMIA, WITHOUT LONG-TERM CURRENT USE OF INSULIN (HCC): ICD-10-CM

## 2022-12-21 DIAGNOSIS — E11.9 TYPE 2 DIABETES MELLITUS WITHOUT COMPLICATION, WITHOUT LONG-TERM CURRENT USE OF INSULIN (HCC): ICD-10-CM

## 2022-12-21 DIAGNOSIS — I10 HYPERTENSION GOAL BP (BLOOD PRESSURE) < 140/90: ICD-10-CM

## 2022-12-21 RX ORDER — GLIPIZIDE 5 MG/1
TABLET, EXTENDED RELEASE ORAL
Qty: 90 TABLET | Refills: 0 | OUTPATIENT
Start: 2022-12-21

## 2022-12-21 RX ORDER — ATORVASTATIN CALCIUM 20 MG/1
TABLET, FILM COATED ORAL
Qty: 90 TABLET | Refills: 0 | OUTPATIENT
Start: 2022-12-21

## 2023-03-29 ENCOUNTER — HOSPITAL ENCOUNTER (EMERGENCY)
Facility: HOSPITAL | Age: 63
Discharge: HOME | End: 2023-03-29
Attending: EMERGENCY MEDICINE

## 2023-03-29 VITALS
SYSTOLIC BLOOD PRESSURE: 132 MMHG | OXYGEN SATURATION: 100 % | DIASTOLIC BLOOD PRESSURE: 74 MMHG | TEMPERATURE: 98.8 F | RESPIRATION RATE: 20 BRPM | HEART RATE: 95 BPM

## 2023-03-29 DIAGNOSIS — L53.9 FACIAL ERYTHEMA: Primary | ICD-10-CM

## 2023-03-29 LAB
ALBUMIN SERPL BCP-MCNC: 4.5 G/DL (ref 3.5–5)
ALP SERPL-CCNC: 83 U/L (ref 34–104)
ALT SERPL W P-5'-P-CCNC: 25 U/L (ref 7–52)
ANION GAP SERPL CALCULATED.3IONS-SCNC: 7 MMOL/L (ref 4–13)
AST SERPL W P-5'-P-CCNC: 20 U/L (ref 13–39)
BASOPHILS # BLD AUTO: 0.06 THOUSANDS/ÂΜL (ref 0–0.1)
BASOPHILS NFR BLD AUTO: 1 % (ref 0–1)
BILIRUB SERPL-MCNC: 0.56 MG/DL (ref 0.2–1)
BUN SERPL-MCNC: 13 MG/DL (ref 5–25)
CALCIUM SERPL-MCNC: 10.3 MG/DL (ref 8.4–10.2)
CARDIAC TROPONIN I PNL SERPL HS: 3 NG/L
CHLORIDE SERPL-SCNC: 100 MMOL/L (ref 96–108)
CO2 SERPL-SCNC: 30 MMOL/L (ref 21–32)
CREAT SERPL-MCNC: 0.92 MG/DL (ref 0.6–1.3)
EOSINOPHIL # BLD AUTO: 0.28 THOUSAND/ÂΜL (ref 0–0.61)
EOSINOPHIL NFR BLD AUTO: 4 % (ref 0–6)
ERYTHROCYTE [DISTWIDTH] IN BLOOD BY AUTOMATED COUNT: 12.7 % (ref 11.6–15.1)
GFR SERPL CREATININE-BSD FRML MDRD: 90 ML/MIN/1.73SQ M
GLUCOSE SERPL-MCNC: 178 MG/DL (ref 65–140)
HCT VFR BLD AUTO: 42.4 % (ref 36.5–49.3)
HGB BLD-MCNC: 14.4 G/DL (ref 12–17)
IMM GRANULOCYTES # BLD AUTO: 0.02 THOUSAND/UL (ref 0–0.2)
IMM GRANULOCYTES NFR BLD AUTO: 0 % (ref 0–2)
LYMPHOCYTES # BLD AUTO: 2.12 THOUSANDS/ÂΜL (ref 0.6–4.47)
LYMPHOCYTES NFR BLD AUTO: 27 % (ref 14–44)
MCH RBC QN AUTO: 30.4 PG (ref 26.8–34.3)
MCHC RBC AUTO-ENTMCNC: 34 G/DL (ref 31.4–37.4)
MCV RBC AUTO: 90 FL (ref 82–98)
MONOCYTES # BLD AUTO: 0.68 THOUSAND/ÂΜL (ref 0.17–1.22)
MONOCYTES NFR BLD AUTO: 9 % (ref 4–12)
NEUTROPHILS # BLD AUTO: 4.76 THOUSANDS/ÂΜL (ref 1.85–7.62)
NEUTS SEG NFR BLD AUTO: 59 % (ref 43–75)
NRBC BLD AUTO-RTO: 0 /100 WBCS
PLATELET # BLD AUTO: 274 THOUSANDS/UL (ref 149–390)
PMV BLD AUTO: 9.3 FL (ref 8.9–12.7)
POTASSIUM SERPL-SCNC: 4.1 MMOL/L (ref 3.5–5.3)
PROT SERPL-MCNC: 7.8 G/DL (ref 6.4–8.4)
RBC # BLD AUTO: 4.73 MILLION/UL (ref 3.88–5.62)
SODIUM SERPL-SCNC: 137 MMOL/L (ref 135–147)
WBC # BLD AUTO: 7.92 THOUSAND/UL (ref 4.31–10.16)

## 2023-03-29 RX ORDER — CEPHALEXIN 500 MG/1
500 CAPSULE ORAL 4 TIMES DAILY
Qty: 27 CAPSULE | Refills: 0 | Status: SHIPPED | OUTPATIENT
Start: 2023-03-29 | End: 2023-04-05

## 2023-03-30 NOTE — ED PROCEDURE NOTE
PROCEDURE  ECG 12 Lead Documentation Only    Date/Time: 3/29/2023 9:12 PM  Performed by: Yrn Huynh MD  Authorized by: Yrn Huynh MD     Indications / Diagnosis:  Facial erythema  ECG reviewed by me, the ED Provider: yes    Patient location:  ED and bedside  Previous ECG:     Previous ECG:  Unavailable    Comparison to cardiac monitor: Yes    Interpretation:     Interpretation: non-specific    Rate:     ECG rate:  94    ECG rate assessment: normal    Rhythm:     Rhythm: sinus rhythm    Ectopy:     Ectopy: none    QRS:     QRS axis:  Normal    QRS intervals:  Normal  Conduction:     Conduction: normal    ST segments:     ST segments:  Normal  T waves:     T waves: flattening      Flattening:  V1 and V2  Q waves:     Q waves:  V1 and V2  Other findings:     Other findings: poor R wave progression and U wave    Comments:      No ecg signs of ischemia/ injury / r heart strain / diana/pericarditis          Yrn Huynh MD  03/29/23 2113

## 2023-03-30 NOTE — DISCHARGE INSTRUCTIONS
Diagnosis; facial erythema- redness    - activity as tolerated     - please return to  the er for any increasing facial swelling/ any lip/ tongue swelling-  difficulty talking/swallowing/ breathing     - I amor l give you a prescription for any antibiotic- please do nto fill it at present-  if you noticed that your face gets increasing redness/warmth/swelling then I would start the antibiotic- keflex- 1 tablet 4 times per day  or 2 tablets 2 times per day for 7 days

## 2023-04-02 LAB
ATRIAL RATE: 94 BPM
P AXIS: 70 DEGREES
PR INTERVAL: 156 MS
QRS AXIS: 54 DEGREES
QRSD INTERVAL: 92 MS
QT INTERVAL: 340 MS
QTC INTERVAL: 425 MS
T WAVE AXIS: 45 DEGREES
VENTRICULAR RATE: 94 BPM

## 2023-04-02 NOTE — ED PROVIDER NOTES
"History  Chief Complaint   Patient presents with   • Medical Problem     PT c/o \"feeling very weird for the last two weeks, I have stretch marks on my face, I think it might be a stroke or something\" PT has no deficits such as weakness and numbness in extremities  63 yr male - approx 2 weeks ago- changed oral dm meds- today at work noticed facial reddness-- no angioedema-- no rash anywhere else--no facial skin trauma- no facial skin defect- no recent fever/uri/sinus pain/ dental pain - no other comps       History provided by:  Patient   used: No    Medical Problem  Associated symptoms: rash    Associated symptoms: no headaches        Prior to Admission Medications   Prescriptions Last Dose Informant Patient Reported? Taking?    Accu-Chek FastClix Lancets MISC   No No   Sig: Use 1 lancet a day to check blood sugar, E11 65   Continuous Blood Gluc  (FREESTYLE JESSICA 14 DAY READER) ROSA   No No   Sig: Use reader device to check blood sugar   Continuous Blood Gluc Sensor (FreeStyle Jessica 14 Day Sensor) MISC   No No   Sig: Use 1 sensor every 14 days to check blood sugar   Flaxseed, Linseed, (FLAXSEED OIL) 1000 MG CAPS   Yes No   Sig: Take by mouth   aspirin (ECOTRIN LOW STRENGTH) 81 mg EC tablet   Yes No   Sig: Take 81 mg by mouth daily   atorvastatin (LIPITOR) 20 mg tablet   No No   Sig: TAKE 1 TABLET DAILY   calcium carbonate (OS-EDIE) 1250 (500 Ca) MG chewable tablet   Yes No   Sig: Chew 2 tablets 2 (two) times a day as needed     clotrimazole-betamethasone (LOTRISONE) 1-0 05 % cream   No No   Sig: Apply to affected area 2 times daily prn   cyanocobalamin (VITAMIN B-12) 100 mcg tablet   Yes No   Sig: Take 1,000 mcg by mouth daily   glipiZIDE (GLUCOTROL XL) 5 mg 24 hr tablet   No No   Sig: TAKE 1 TABLET DAILY   glucose 4 g chewable tablet   No No   Sig: Chew 4 tablets (16 g total) as needed for low blood sugar   glucose blood (Accu-Chek Guide) test strip   No No   Sig: Use 1 test strip a " day to check blood sugar, E11 65   guaiFENesin (MUCINEX) 600 mg 12 hr tablet   Yes No   Sig: Take 1,200 mg by mouth every 12 (twelve) hours   ketoconazole (NIZORAL) 2 % cream   No No   Sig: Apply to face twice a day when flared, use twice a week when not flared   metFORMIN (GLUCOPHAGE) 500 mg tablet   No No   Sig: TAKE 2 TABLETS TWO TIMES A DAY WITH MEALS   sitaGLIPtin (JANUVIA) 100 mg tablet   No No   Sig: Take 1 tablet (100 mg total) by mouth daily   Patient not taking: Reported on 7/19/2021   triamcinolone (KENALOG) 0 1 % ointment   No No   Sig: Apply topically 2 (two) times a day To rash      Facility-Administered Medications: None       Past Medical History:   Diagnosis Date   • Diabetes mellitus ([facility] [address] )        Past Surgical History:   Procedure Laterality Date   • FRACTURE SURGERY         Family History   Problem Relation Age of Onset   • Diabetes Mother    • Diabetes Sister    • Diabetes Brother    • Pancreatic cancer Sister    • Aneurysm Sister         vertebral artery aneurysm     I have reviewed and agree with the history as documented  E-Cigarette/Vaping   • E-Cigarette Use Never User      E-Cigarette/Vaping Substances     Social History     Tobacco Use   • Smoking status: Former     Types: Cigarettes   • Smokeless tobacco: Never   Vaping Use   • Vaping Use: Never used   Substance Use Topics   • Alcohol use: No     Comment: 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)   • Drug use: No       Review of Systems   Constitutional: Negative  HENT: Negative  Eyes: Negative  Respiratory: Negative  Cardiovascular: Negative  Gastrointestinal: Negative  Endocrine: Negative  Genitourinary: Negative  Musculoskeletal: Negative  Skin: Positive for rash  Negative for color change, pallor and wound  Allergic/Immunologic: Negative  Neurological: Positive for light-headedness   Negative for dizziness, tremors, seizures, syncope, facial asymmetry, speech difficulty, weakness, numbness and headaches  Hematological: Negative  Psychiatric/Behavioral: Negative  Physical Exam  Physical Exam  Vitals and nursing note reviewed  Constitutional:       General: He is not in acute distress  Appearance: Normal appearance  He is not ill-appearing, toxic-appearing or diaphoretic  Comments: avss--  Pulse ox 100 % on ra- interpretation is normal- no intervention- very well appearing in and    HENT:      Head: Normocephalic and atraumatic  Comments: Mild mid facial erythema- not cellulitis- no angiodema - no facial max/frontal sinus tenderness to palpation - no bilateral ta area of tendernss/edema/ erythema/ nodularity      Right Ear: Tympanic membrane, ear canal and external ear normal  There is no impacted cerumen  Left Ear: Tympanic membrane, ear canal and external ear normal  There is no impacted cerumen  Nose: Nose normal  No congestion or rhinorrhea  Mouth/Throat:      Mouth: Mucous membranes are moist       Pharynx: Oropharynx is clear  No oropharyngeal exudate or posterior oropharyngeal erythema  Comments: Normal oropharyngeal exam   Eyes:      General: No scleral icterus  Right eye: No discharge  Left eye: No discharge  Extraocular Movements: Extraocular movements intact  Conjunctiva/sclera: Conjunctivae normal       Pupils: Pupils are equal, round, and reactive to light  Comments: Mm pi nk   Neck:      Vascular: No carotid bruit  Comments: No pmt c/t/l/s spine   Cardiovascular:      Rate and Rhythm: Normal rate and regular rhythm  Pulses: Normal pulses  Heart sounds: Normal heart sounds  No murmur heard  No friction rub  No gallop  Pulmonary:      Effort: Pulmonary effort is normal  No respiratory distress  Breath sounds: Normal breath sounds  No stridor  No wheezing, rhonchi or rales  Chest:      Chest wall: No tenderness  Abdominal:      General: Bowel sounds are normal  There is no distension  Palpations: Abdomen is soft  There is no mass  Tenderness: There is no abdominal tenderness  There is no right CVA tenderness, left CVA tenderness, guarding or rebound  Hernia: No hernia is present  Comments: Soft nt nd- no hsm- no cva tenderness- no ascites- no p eritoneal signs- no pulsatile abd mass/bruit/ tenderness   Musculoskeletal:         General: No swelling, tenderness, deformity or signs of injury  Normal range of motion  Cervical back: Normal range of motion and neck supple  No rigidity or tenderness  Right lower leg: No edema  Left lower leg: No edema  Comments: Equal bilateral radial/dp pulses- no ble edema/claf tenderness/asym/ erythema   Lymphadenopathy:      Cervical: No cervical adenopathy  Skin:     General: Skin is warm  Coloration: Skin is not jaundiced or pale  Findings: Erythema present  No bruising, lesion or rash  Comments: See above for facial erythema   Neurological:      General: No focal deficit present  Mental Status: He is alert and oriented to person, place, and time  Mental status is at baseline  Cranial Nerves: No cranial nerve deficit  Sensory: No sensory deficit  Motor: No weakness  Coordination: Coordination normal       Gait: Gait normal       Deep Tendon Reflexes: Reflexes normal       Comments: Completely normal non focal neuro exam - no nystagmus - neg test of sckew- normal finger to nose/heel to shin all bilaterally- no truncal ataxia/ normal gait    Psychiatric:         Mood and Affect: Mood normal          Behavior: Behavior normal          Thought Content:  Thought content normal          Judgment: Judgment normal          Vital Signs  ED Triage Vitals [03/29/23 1756]   Temperature Pulse Respirations Blood Pressure SpO2   98 8 °F (37 1 °C) 95 20 132/74 100 %      Temp Source Heart Rate Source Patient Position - Orthostatic VS BP Location FiO2 (%)   Oral Monitor Sitting Right arm --      Pain Score       --           Vitals:    03/29/23 1756   BP: 132/74   Pulse: 95   Patient Position - Orthostatic VS: Sitting         Visual Acuity  Visual Acuity    Flowsheet Row Most Recent Value   L Pupil Size (mm) 3   R Pupil Size (mm) 3          ED Medications  Medications - No data to display    Diagnostic Studies  Results Reviewed     Procedure Component Value Units Date/Time    HS Troponin 0hr (reflex protocol) [[id number]]  (Normal) Collected: 03/29/23 1802    Lab Status: Final result Specimen: Blood from Arm, Left Updated: 03/29/23 1844     hs TnI 0hr 3 ng/L     Comprehensive metabolic panel [[id number]]  (Abnormal) Collected: 03/29/23 1802    Lab Status: Final result Specimen: Blood from Arm, Left Updated: 03/29/23 1837     Sodium 137 mmol/L      Potassium 4 1 mmol/L      Chloride 100 mmol/L      CO2 30 mmol/L      ANION GAP 7 mmol/L      BUN 13 mg/dL      Creatinine 0 92 mg/dL      Glucose 178 mg/dL      Calcium 10 3 mg/dL      AST 20 U/L      ALT 25 U/L      Alkaline Phosphatase 83 U/L      Total Protein 7 8 g/dL      Albumin 4 5 g/dL      Total Bilirubin 0 56 mg/dL      eGFR 90 ml/min/1 73sq m     Narrative:      [place] guidelines for Chronic Kidney Disease (CKD):   •  Stage 1 with normal or high GFR (GFR > 90 mL/min/1 73 square meters)  •  Stage 2 Mild CKD (GFR = 60-89 mL/min/1 73 square meters)  •  Stage 3A Moderate CKD (GFR = 45-59 mL/min/1 73 square meters)  •  Stage 3B Moderate CKD (GFR = 30-44 mL/min/1 73 square meters)  •  Stage 4 Severe CKD (GFR = 15-29 mL/min/1 73 square meters)  •  Stage 5 End Stage CKD (GFR <15 mL/min/1 73 square meters)  Note: GFR calculation is accurate only with a steady state creatinine    CBC and differential [[id number]] Collected: 03/29/23 1802    Lab Status: Final result Specimen: Blood from Arm, Left Updated: 03/29/23 1814     WBC 7 92 Thousand/uL      RBC 4 73 Million/uL      Hemoglobin 14 4 g/dL      Hematocrit 42 4 %      MCV 90 fL      MCH 30 4 pg      MCHC 34 0 g/dL      RDW 12 7 %      MPV 9 3 fL      Platelets 548 Thousands/uL      nRBC 0 /100 WBCs      Neutrophils Relative 59 %      Immat GRANS % 0 %      Lymphocytes Relative 27 %      Monocytes Relative 9 %      Eosinophils Relative 4 %      Basophils Relative 1 %      Neutrophils Absolute 4 76 Thousands/µL      Immature Grans Absolute 0 02 Thousand/uL      Lymphocytes Absolute 2 12 Thousands/µL      Monocytes Absolute 0 68 Thousand/µL      Eosinophils Absolute 0 28 Thousand/µL      Basophils Absolute 0 06 Thousands/µL                  No orders to display              Procedures  Procedures         ED Course  ED Course as of 04/02/23 1338   Wed Mar 29, 2023   2039 - ER MD NOTE-  INITIAL WORKUP WAS FIRST NURSED                                              Medical Decision Making  Pt with completely normal exam -- with mld  facial erythema- not cellulitic appearing-- possible med side effect verse possiblel early  Skin infection - er md doubt any other serious cause--     Facial erythema: acute illness or injury     Details: see abo e- pt will need home monitoring   Amount and/or Complexity of Data Reviewed  External Data Reviewed: labs and ECG  Details: all reviewed by er md   Labs: ordered  Decision-making details documented in ED Course  Details: all reviewed and comapred by er md   ECG/medicine tests: ordered and independent interpretation performed  Details: all reviewed and comapred by er md   Discussion of management or test interpretation with external provider(s): moderate amount of er md thought complexity  And mild er workup     Risk  Prescription drug management  Decision regarding hospitalization            Disposition  Final diagnoses:   Facial erythema     Time reflects when diagnosis was documented in both MDM as applicable and the Disposition within this note     Time User Action Codes Description Comment    3/29/2023  8:45 PM Pankaj Qureshi Add [L53 9] Facial erythema       ED Disposition     ED Disposition   Discharge    Condition   Stable    Date/Time   Wed Mar 29, 2023  8:45 PM    Comment   Joesuzie Flaco Quick discharge to home/self care                 Follow-up Information    None         Discharge Medication List as of 3/29/2023  9:21 PM      START taking these medications    Details   cephalexin (KEFLEX) 500 mg capsule Take 1 capsule (500 mg total) by mouth 4 (four) times a day for 27 doses, Starting Wed 3/29/2023, Until Wed 4/5/2023, Normal         CONTINUE these medications which have NOT CHANGED    Details   Accu-Chek FastClix Lancets MISC Use 1 lancet a day to check blood sugar, E11 65, Normal      aspirin (ECOTRIN LOW STRENGTH) 81 mg EC tablet Take 81 mg by mouth daily, Historical Med      atorvastatin (LIPITOR) 20 mg tablet TAKE 1 TABLET DAILY, Normal      calcium carbonate (OS-EDIE) 1250 (500 Ca) MG chewable tablet Chew 2 tablets 2 (two) times a day as needed  , Historical Med      clotrimazole-betamethasone (LOTRISONE) 1-0 05 % cream Apply to affected area 2 times daily prn, Normal      Continuous Blood Gluc  (FREESTYLE NYA 14 DAY READER) ROSA Use reader device to check blood sugar, Normal      Continuous Blood Gluc Sensor (FreeStyle Nya 14 Day Sensor) MISC Use 1 sensor every 14 days to check blood sugar, Normal      cyanocobalamin (VITAMIN B-12) 100 mcg tablet Take 1,000 mcg by mouth daily, Historical Med      Flaxseed, Linseed, (FLAXSEED OIL) 1000 MG CAPS Take by mouth, Historical Med      glipiZIDE (GLUCOTROL XL) 5 mg 24 hr tablet TAKE 1 TABLET DAILY, Normal      glucose 4 g chewable tablet Chew 4 tablets (16 g total) as needed for low blood sugar, Starting Fri 5/29/2020, Normal      glucose blood (Accu-Chek Guide) test strip Use 1 test strip a day to check blood sugar, E11 65, Normal      guaiFENesin (MUCINEX) 600 mg 12 hr tablet Take 1,200 mg by mouth every 12 (twelve) hours, Historical Med      ketoconazole (NIZORAL) 2 % cream Apply to face twice a day when flared, use twice a week when not flared, Normal      metFORMIN (GLUCOPHAGE) 500 mg tablet TAKE 2 TABLETS TWO TIMES A DAY WITH MEALS, Normal      sitaGLIPtin (JANUVIA) 100 mg tablet Take 1 tablet (100 mg total) by mouth daily, Starting Tue 5/4/2021, Normal      triamcinolone (KENALOG) 0 1 % ointment Apply topically 2 (two) times a day To rash, Starting Tue 12/31/2019, Normal             No discharge procedures on file      PDMP Review     None          ED Provider  Electronically Signed by           Christen Anne MD  04/02/23 6181

## 2024-01-29 ENCOUNTER — APPOINTMENT (EMERGENCY)
Dept: RADIOLOGY | Facility: HOSPITAL | Age: 64
End: 2024-01-29
Payer: COMMERCIAL

## 2024-01-29 ENCOUNTER — HOSPITAL ENCOUNTER (EMERGENCY)
Facility: HOSPITAL | Age: 64
Discharge: HOME | End: 2024-01-29
Attending: EMERGENCY MEDICINE
Payer: COMMERCIAL

## 2024-01-29 VITALS
DIASTOLIC BLOOD PRESSURE: 74 MMHG | TEMPERATURE: 97.8 F | RESPIRATION RATE: 18 BRPM | SYSTOLIC BLOOD PRESSURE: 149 MMHG | HEART RATE: 94 BPM | OXYGEN SATURATION: 96 %

## 2024-01-29 DIAGNOSIS — R55 SYNCOPE: Primary | ICD-10-CM

## 2024-01-29 DIAGNOSIS — F43.9 STRESS: ICD-10-CM

## 2024-01-29 DIAGNOSIS — E86.0 DEHYDRATION: ICD-10-CM

## 2024-01-29 LAB
2HR DELTA HS TROPONIN: -1 NG/L
ALBUMIN SERPL BCP-MCNC: 4.6 G/DL (ref 3.5–5)
ALP SERPL-CCNC: 85 U/L (ref 34–104)
ALT SERPL W P-5'-P-CCNC: 20 U/L (ref 7–52)
ANION GAP SERPL CALCULATED.3IONS-SCNC: 11 MMOL/L
AST SERPL W P-5'-P-CCNC: 20 U/L (ref 13–39)
BACTERIA UR QL AUTO: ABNORMAL /HPF
BASOPHILS # BLD AUTO: 0.04 THOUSANDS/ÂΜL (ref 0–0.1)
BASOPHILS NFR BLD AUTO: 1 % (ref 0–1)
BILIRUB SERPL-MCNC: 0.45 MG/DL (ref 0.2–1)
BILIRUB UR QL STRIP: NEGATIVE
BUN SERPL-MCNC: 12 MG/DL (ref 5–25)
CALCIUM SERPL-MCNC: 10 MG/DL (ref 8.4–10.2)
CARDIAC TROPONIN I PNL SERPL HS: 3 NG/L
CARDIAC TROPONIN I PNL SERPL HS: 4 NG/L
CHLORIDE SERPL-SCNC: 96 MMOL/L (ref 96–108)
CLARITY UR: CLEAR
CO2 SERPL-SCNC: 27 MMOL/L (ref 21–32)
COLOR UR: YELLOW
CREAT SERPL-MCNC: 0.91 MG/DL (ref 0.6–1.3)
EOSINOPHIL # BLD AUTO: 0.09 THOUSAND/ÂΜL (ref 0–0.61)
EOSINOPHIL NFR BLD AUTO: 1 % (ref 0–6)
ERYTHROCYTE [DISTWIDTH] IN BLOOD BY AUTOMATED COUNT: 12.9 % (ref 11.6–15.1)
GFR SERPL CREATININE-BSD FRML MDRD: 90 ML/MIN/1.73SQ M
GLUCOSE SERPL-MCNC: 243 MG/DL (ref 65–140)
GLUCOSE UR STRIP-MCNC: ABNORMAL MG/DL
HCT VFR BLD AUTO: 43.7 % (ref 36.5–49.3)
HGB BLD-MCNC: 14.6 G/DL (ref 12–17)
HGB UR QL STRIP.AUTO: NEGATIVE
IMM GRANULOCYTES # BLD AUTO: 0.03 THOUSAND/UL (ref 0–0.2)
IMM GRANULOCYTES NFR BLD AUTO: 1 % (ref 0–2)
KETONES UR STRIP-MCNC: ABNORMAL MG/DL
LEUKOCYTE ESTERASE UR QL STRIP: ABNORMAL
LYMPHOCYTES # BLD AUTO: 2.35 THOUSANDS/ÂΜL (ref 0.6–4.47)
LYMPHOCYTES NFR BLD AUTO: 37 % (ref 14–44)
MAGNESIUM SERPL-MCNC: 1.9 MG/DL (ref 1.9–2.7)
MCH RBC QN AUTO: 30.2 PG (ref 26.8–34.3)
MCHC RBC AUTO-ENTMCNC: 33.4 G/DL (ref 31.4–37.4)
MCV RBC AUTO: 91 FL (ref 82–98)
MONOCYTES # BLD AUTO: 0.92 THOUSAND/ÂΜL (ref 0.17–1.22)
MONOCYTES NFR BLD AUTO: 15 % (ref 4–12)
MUCOUS THREADS UR QL AUTO: ABNORMAL
NEUTROPHILS # BLD AUTO: 2.85 THOUSANDS/ÂΜL (ref 1.85–7.62)
NEUTS SEG NFR BLD AUTO: 45 % (ref 43–75)
NITRITE UR QL STRIP: NEGATIVE
NON-SQ EPI CELLS URNS QL MICRO: ABNORMAL /HPF
NRBC BLD AUTO-RTO: 0 /100 WBCS
PH UR STRIP.AUTO: 5 [PH]
PLATELET # BLD AUTO: 226 THOUSANDS/UL (ref 149–390)
PMV BLD AUTO: 9.6 FL (ref 8.9–12.7)
POTASSIUM SERPL-SCNC: 3.4 MMOL/L (ref 3.5–5.3)
PROT SERPL-MCNC: 8.1 G/DL (ref 6.4–8.4)
PROT UR STRIP-MCNC: ABNORMAL MG/DL
RBC # BLD AUTO: 4.83 MILLION/UL (ref 3.88–5.62)
RBC #/AREA URNS AUTO: ABNORMAL /HPF
SODIUM SERPL-SCNC: 134 MMOL/L (ref 135–147)
SP GR UR STRIP.AUTO: 1.04 (ref 1–1.03)
UROBILINOGEN UR STRIP-ACNC: <2 MG/DL
WBC # BLD AUTO: 6.28 THOUSAND/UL (ref 4.31–10.16)
WBC #/AREA URNS AUTO: ABNORMAL /HPF

## 2024-01-29 PROCEDURE — 82948 REAGENT STRIP/BLOOD GLUCOSE: CPT

## 2024-01-29 PROCEDURE — 36415 COLL VENOUS BLD VENIPUNCTURE: CPT | Performed by: EMERGENCY MEDICINE

## 2024-01-29 PROCEDURE — 81001 URINALYSIS AUTO W/SCOPE: CPT | Performed by: EMERGENCY MEDICINE

## 2024-01-29 PROCEDURE — 99285 EMERGENCY DEPT VISIT HI MDM: CPT | Performed by: EMERGENCY MEDICINE

## 2024-01-29 PROCEDURE — 84484 ASSAY OF TROPONIN QUANT: CPT | Performed by: EMERGENCY MEDICINE

## 2024-01-29 PROCEDURE — 99285 EMERGENCY DEPT VISIT HI MDM: CPT

## 2024-01-29 PROCEDURE — 96361 HYDRATE IV INFUSION ADD-ON: CPT

## 2024-01-29 PROCEDURE — 83735 ASSAY OF MAGNESIUM: CPT | Performed by: EMERGENCY MEDICINE

## 2024-01-29 PROCEDURE — 96360 HYDRATION IV INFUSION INIT: CPT

## 2024-01-29 PROCEDURE — 93005 ELECTROCARDIOGRAM TRACING: CPT

## 2024-01-29 PROCEDURE — 85025 COMPLETE CBC W/AUTO DIFF WBC: CPT | Performed by: EMERGENCY MEDICINE

## 2024-01-29 PROCEDURE — 80053 COMPREHEN METABOLIC PANEL: CPT | Performed by: EMERGENCY MEDICINE

## 2024-01-29 PROCEDURE — 71045 X-RAY EXAM CHEST 1 VIEW: CPT

## 2024-01-29 RX ADMIN — SODIUM CHLORIDE 1000 ML: 0.9 INJECTION, SOLUTION INTRAVENOUS at 16:37

## 2024-01-29 NOTE — ED PROVIDER NOTES
History  Chief Complaint   Patient presents with    Syncope     Pt was visiting wife and had an episode of syncope/unresponsiveness lasting about 30 seconds. Hx diabetes. . C/o weakness     63-year-old male syncope while seated.  Patient was visiting his wife when he became unresponsive lasted approximately 30 seconds.  Found patient unconscious was revived with sternal rub.  Patient states that he did not eat or drink much today.  Blood sugar okay he is a known diabetic.  Patient states he has had episodes of syncope in the past with different medications.  Patient immediately came around and was awake alert and oriented.  No seizure-like activity no loss of bowel or bladder function no postictal state.      History provided by:  Patient and spouse   used: No    Syncope  Episode history:  Single  Most recent episode:  Today  Duration:  30 seconds  Progression:  Resolved  Chronicity:  Recurrent  Context: sitting down    Witnessed: yes    Ineffective treatments:  None tried  Associated symptoms: dizziness    Associated symptoms: no chest pain, no confusion, no diaphoresis, no difficulty breathing, no headaches, no seizures and no shortness of breath    Risk factors: no congenital heart disease and no vascular disease        Prior to Admission Medications   Prescriptions Last Dose Informant Patient Reported? Taking?   Accu-Chek FastClix Lancets MISC   No No   Sig: Use 1 lancet a day to check blood sugar, E11.65   Continuous Blood Gluc  (FREESTYLE JESSICA 14 DAY READER) ROSA   No No   Sig: Use reader device to check blood sugar   Continuous Blood Gluc Sensor (FreeStyle Jessica 14 Day Sensor) [facility]   No No   Sig: Use 1 sensor every 14 days to check blood sugar   Flaxseed, Linseed, (FLAXSEED OIL) 1000 MG CAPS   Yes No   Sig: Take by mouth   aspirin (ECOTRIN LOW STRENGTH) 81 mg EC tablet  Self Yes No   Sig: Take 81 mg by mouth daily   atorvastatin (LIPITOR) 20 mg tablet   No No   Sig: TAKE 1  TABLET DAILY   calcium carbonate (OS-EDIE) 1250 (500 Ca) MG chewable tablet  Self Yes No   Sig: Chew 2 tablets 2 (two) times a day as needed     clotrimazole-betamethasone (LOTRISONE) 1-0.05 % cream   No No   Sig: Apply to affected area 2 times daily prn   cyanocobalamin (VITAMIN B-12) 100 mcg tablet   Yes No   Sig: Take 1,000 mcg by mouth daily   glipiZIDE (GLUCOTROL XL) 5 mg 24 hr tablet   No No   Sig: TAKE 1 TABLET DAILY   glucose 4 g chewable tablet   No No   Sig: Chew 4 tablets (16 g total) as needed for low blood sugar   glucose blood (Accu-Chek Guide) test strip   No No   Sig: Use 1 test strip a day to check blood sugar, E11.65   guaiFENesin (MUCINEX) 600 mg 12 hr tablet   Yes No   Sig: Take 1,200 mg by mouth every 12 (twelve) hours   ketoconazole (NIZORAL) 2 % cream   No No   Sig: Apply to face twice a day when flared, use twice a week when not flared   metFORMIN (GLUCOPHAGE) 500 mg tablet   No No   Sig: TAKE 2 TABLETS TWO TIMES A DAY WITH MEALS   sitaGLIPtin (JANUVIA) 100 mg tablet   No No   Sig: Take 1 tablet (100 mg total) by mouth daily   Patient not taking: Reported on 7/19/2021   triamcinolone (KENALOG) 0.1 % ointment   No No   Sig: Apply topically 2 (two) times a day To rash      Facility-Administered Medications: None       Past Medical History:   Diagnosis Date    Diabetes mellitus (HCC)        Past Surgical History:   Procedure Laterality Date    FRACTURE SURGERY         Family History   Problem Relation Age of Onset    Diabetes Mother     Diabetes Sister     Diabetes Brother     Pancreatic cancer Sister     Aneurysm Sister         vertebral artery aneurysm     I have reviewed and agree with the history as documented.    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   HENT:  Negative for congestion and facial swelling.    Eyes:  Negative for discharge and redness.   Respiratory:  Negative for cough, shortness of breath and wheezing.    Cardiovascular:  Positive for syncope. Negative for chest pain and leg swelling.   Gastrointestinal:  Negative for abdominal distention, abdominal pain and blood in stool.   Endocrine: Negative for cold intolerance and polydipsia.   Genitourinary:  Negative for difficulty urinating and hematuria.   Musculoskeletal:  Negative for arthralgias and gait problem.   Skin:  Negative for color change and rash.   Allergic/Immunologic: Negative for food allergies and immunocompromised state.   Neurological:  Positive for dizziness. Negative for seizures and headaches.   Hematological:  Negative for adenopathy. Does not bruise/bleed easily.   Psychiatric/Behavioral:  Negative for agitation, confusion and decreased concentration.    All other systems reviewed and are negative.      Physical Exam  Physical Exam  Vitals and nursing note reviewed.   Constitutional:       General: He is not in acute distress.     Appearance: He is well-developed.   HENT:      Head: Normocephalic and atraumatic.   Eyes:      Conjunctiva/sclera: Conjunctivae normal.   Cardiovascular:      Rate and Rhythm: Normal rate and regular rhythm.      Heart sounds: No murmur heard.  Pulmonary:      Effort: Pulmonary effort is normal. No respiratory distress.      Breath sounds: Normal breath sounds.   Abdominal:      Palpations: Abdomen is soft.      Tenderness: There is no abdominal tenderness.   Musculoskeletal:         General: No swelling.      Cervical back: Neck supple.   Skin:     General: Skin is warm and dry.      Capillary Refill: Capillary refill takes less than 2 seconds.   Neurological:       Mental Status: He is alert.   Psychiatric:         Mood and Affect: Mood normal.         Vital Signs  ED Triage Vitals   Temperature Pulse Respirations Blood Pressure SpO2   01/29/24 1635 01/29/24 1632 01/29/24 1632 01/29/24 1632 01/29/24 1632   97.8 °F (36.6 °C) 69 18 158/76 99 %      Temp Source Heart Rate Source Patient Position - Orthostatic VS BP Location FiO2 (%)   01/29/24 1635 01/29/24 1632 01/29/24 1806 01/29/24 1632 --   Oral Monitor Lying - Orthostatic VS Right arm       Pain Score       --                  Vitals:    01/29/24 1807 01/29/24 1808 01/29/24 1811 01/29/24 1900   BP: 140/71 144/70 152/77 146/75   Pulse: 76 88 93 86   Patient Position - Orthostatic VS: Sitting - Orthostatic VS Standing - Orthostatic VS Standing for 3 minutes - Orthostatic VS          Visual Acuity      ED Medications  Medications   sodium chloride 0.9 % bolus 1,000 mL (1,000 mL Intravenous New Bag 1/29/24 1637)       Diagnostic Studies  Results Reviewed       Procedure Component Value Units Date/Time    HS Troponin I 2hr [[id number]]  (Normal) Collected: 01/29/24 1845    Lab Status: Final result Specimen: Blood from Arm, Left Updated: 01/29/24 1919     hs TnI 2hr 3 ng/L      Delta 2hr hsTnI -1 ng/L     Urine Microscopic [[id number]]  (Abnormal) Collected: 01/29/24 1805    Lab Status: Final result Specimen: Urine, Clean Catch Updated: 01/29/24 1849     RBC, UA 1-2 /hpf      WBC, UA 1-2 /hpf      Epithelial Cells None Seen /hpf      Bacteria, UA None Seen /hpf      MUCUS THREADS Moderate    UA (URINE) with reflex to Scope [[id number]]  (Abnormal) Collected: 01/29/24 1805    Lab Status: Final result Specimen: Urine, Clean Catch Updated: 01/29/24 1846     Color, UA Yellow     Clarity, UA Clear     Specific Gravity, UA 1.043     pH, UA 5.0     Leukocytes, UA Elevated glucose may cause decreased leukocyte values. See urine microscopic for UWBC result     Nitrite, UA Negative     Protein, UA 30 (1+) mg/dl      Glucose, UA >=1000 (1%)  mg/dl      Ketones, UA 10 (1+) mg/dl      Urobilinogen, UA <2.0 mg/dl      Bilirubin, UA Negative     Occult Blood, UA Negative    HS Troponin I 4hr [[id number]]     Lab Status: No result Specimen: Blood     HS Troponin 0hr (reflex protocol) [[id number]]  (Normal) Collected: 01/29/24 1636    Lab Status: Final result Specimen: Blood from Arm, Left Updated: 01/29/24 1723     hs TnI 0hr 4 ng/L     Comprehensive metabolic panel [[id number]]  (Abnormal) Collected: 01/29/24 1636    Lab Status: Final result Specimen: Blood from Arm, Left Updated: 01/29/24 1715     Sodium 134 mmol/L      Potassium 3.4 mmol/L      Chloride 96 mmol/L      CO2 27 mmol/L      ANION GAP 11 mmol/L      BUN 12 mg/dL      Creatinine 0.91 mg/dL      Glucose 243 mg/dL      Calcium 10.0 mg/dL      AST 20 U/L      ALT 20 U/L      Alkaline Phosphatase 85 U/L      Total Protein 8.1 g/dL      Albumin 4.6 g/dL      Total Bilirubin 0.45 mg/dL      eGFR 90 ml/min/1.73sq m     Narrative:      National Kid[employer] Disease Foundation guidelines for Chronic Kidney Disease (CKD):     Stage 1 with normal or high GFR (GFR > 90 mL/min/1.73 square meters)    Stage 2 Mild CKD (GFR = 60-89 mL/min/1.73 square meters)    Stage 3A Moderate CKD (GFR = 45-59 mL/min/1.73 square meters)    Stage 3B Moderate CKD (GFR = 30-44 mL/min/1.73 square meters)    Stage 4 Severe CKD (GFR = 15-29 mL/min/1.73 square meters)    Stage 5 End Stage CKD (GFR <15 mL/min/1.73 square meters)  Note: GFR calculation is accurate only with a steady state creatinine    Magnesium [[id number]]  (Normal) Collected: 01/29/24 1636    Lab Status: Final result Specimen: Blood from Arm, Left Updated: 01/29/24 1715     Magnesium 1.9 mg/dL     CBC and differential [[id number]]  (Abnormal) Collected: 01/29/24 1636    Lab Status: Final result Specimen: Blood from Arm, Left Updated: 01/29/24 1653     WBC 6.28 Thousand/uL      RBC 4.83 Million/uL      Hemoglobin 14.6 g/dL      Hematocrit 43.7 %      MCV 91 fL      MCH 30.2  pg      MCHC 33.4 g/dL      RDW 12.9 %      MPV 9.6 fL      Platelets 226 Thousands/uL      nRBC 0 /100 WBCs      Neutrophils Relative 45 %      Immat GRANS % 1 %      Lymphocytes Relative 37 %      Monocytes Relative 15 %      Eosinophils Relative 1 %      Basophils Relative 1 %      Neutrophils Absolute 2.85 Thousands/µL      Immature Grans Absolute 0.03 Thousand/uL      Lymphocytes Absolute 2.35 Thousands/µL      Monocytes Absolute 0.92 Thousand/µL      Eosinophils Absolute 0.09 Thousand/µL      Basophils Absolute 0.04 Thousands/µL                    XR chest 1 view portable    (Results Pending)              Procedures  ECG 12 Lead Documentation Only    Date/Time: 1/29/2024 4:34 PM    Performed by: Chelsea Merida DO  Authorized by: Chelsea Merida DO    Rate:     ECG rate:  72  Rhythm:     Rhythm: sinus rhythm    Ectopy:     Ectopy: none    QRS:     QRS axis:  Normal           ED Course                                             Medical Decision Making  Differential diagnosis includes but is not limited to syncope, hypoglycemia, arrhythmia, ACS, electrolyte abnormality, dehydration, stress reaction    Problems Addressed:  Dehydration: acute illness or injury  Stress: acute illness or injury  Syncope: acute illness or injury     Details: Patient states he has passed out in the past and had a workup and 1 has been able to tell him why it is that he passes out    Amount and/or Complexity of Data Reviewed  External Data Reviewed: notes.     Details: Reviewed past ED visit for syncope in 2019  Labs: ordered. Decision-making details documented in ED Course.  Radiology: ordered and independent interpretation performed.     Details: Chest x-ray within normal limits no pneumonia  ECG/medicine tests: ordered and independent interpretation performed. Decision-making details documented in ED Course.    Risk  OTC drugs.  Prescription drug management.  Decision regarding hospitalization.  Risk Details: Discussed with  patient states he is back to baseline I did offer him admission to the hospital states he feels fine and would prefer to go home.             Disposition  Final diagnoses:   Syncope   Dehydration   Stress     Time reflects when diagnosis was documented in both MDM as applicable and the Disposition within this note       Time User Action Codes Description Comment    1/29/2024  7:22 PM Chelsea Merida Add [R55] Syncope     1/29/2024  7:22 PM Chelsea Merida Add [E86.0] Dehydration     1/29/2024  7:22 PM Chelsea Merida Add [F43.9] Stress           ED Disposition       ED Disposition   Discharge    Condition   Stable    Date/Time   Mon Jan 29, 2024  7:22 PM    Comment   Kian A Quick discharge to home/self care.                   Follow-up Information       Follow up With Specialties Details Why Contact Info    Manjinder Pan MD Internal Medicine Schedule an appointment as soon as possible for a visit   [address] [facility]  [address]  [place] [address]  [phone]              Patient's Medications   Discharge Prescriptions    No medications on file       No discharge procedures on file.    PDMP Review       None            ED Provider  Electronically Signed by             Chelsea Merida DO  01/29/24 1933     Skyrizi Counseling: I discussed with the patient the risks of risankizumab-rzaa including but not limited to immunosuppression, and serious infections.  The patient understands that monitoring is required including a PPD at baseline and must alert us or the primary physician if symptoms of infection or other concerning signs are noted.

## 2024-01-30 LAB
ATRIAL RATE: 72 BPM
GLUCOSE SERPL-MCNC: 218 MG/DL (ref 65–140)
P AXIS: 32 DEGREES
PR INTERVAL: 152 MS
QRS AXIS: 60 DEGREES
QRSD INTERVAL: 94 MS
QT INTERVAL: 376 MS
QTC INTERVAL: 411 MS
T WAVE AXIS: 61 DEGREES
VENTRICULAR RATE: 72 BPM

## 2024-02-21 PROBLEM — Z11.59 NEED FOR HEPATITIS C SCREENING TEST: Status: RESOLVED | Noted: 2019-10-30 | Resolved: 2024-02-21

## 2025-05-23 ENCOUNTER — HOSPITAL ENCOUNTER (EMERGENCY)
Facility: HOSPITAL | Age: 65
Discharge: HOME | End: 2025-05-23
Attending: EMERGENCY MEDICINE | Admitting: EMERGENCY MEDICINE
Payer: COMMERCIAL

## 2025-05-23 VITALS
TEMPERATURE: 98 F | OXYGEN SATURATION: 99 % | SYSTOLIC BLOOD PRESSURE: 180 MMHG | HEART RATE: 90 BPM | RESPIRATION RATE: 18 BRPM | DIASTOLIC BLOOD PRESSURE: 82 MMHG

## 2025-05-23 DIAGNOSIS — K40.90 INGUINAL HERNIA: Primary | ICD-10-CM

## 2025-05-23 PROCEDURE — 99283 EMERGENCY DEPT VISIT LOW MDM: CPT | Performed by: EMERGENCY MEDICINE

## 2025-05-23 PROCEDURE — 99283 EMERGENCY DEPT VISIT LOW MDM: CPT

## 2025-05-23 NOTE — ED PROVIDER NOTES
Time reflects when diagnosis was documented in both MDM as applicable and the Disposition within this note       Time User Action Codes Description Comment    5/23/2025  3:16 PM Jean Claude, Kvng WRIGHT Add [K40.90] Inguinal hernia           ED Disposition       ED Disposition   Discharge    Condition   Stable    Date/Time   Fri May 23, 2025  3:16 PM    Comment   Kian A Quick discharge to home/self care.                   Assessment & Plan       Medical Decision Making  65 yoM presenting due to R groin bulging area. Pt noticed this yesterday, states it does not hurt, denies zoey penial and testicular symptoms.  Normal BM, no dysuria. Bulge does fluctuate in size.  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    Vitals reviewed, htn but otherwise WNL. PE suggesting of R inguinal hernia, easily reducible, no incarceration or strangulation suspected.  No tenderness or erythema appreciate.  Will have pt follow up with Gen Surg for reassessment and outpatient management.        ED Course as of 05/26/25 1751   Fri May 23, 2025   1504 R groin bulge, noticed yesterday, does physical job lifting large buckets.         Medications - No data to display    ED Risk Strat Scores                    No data recorded        SBIRT 22yo+      Flowsheet Row Most Recent Value   Initial Alcohol Screen: US AUDIT-C     1. How often do you have a drink containing alcohol? 0 Filed at: 05/23/2025 1449   2. How many drinks containing alcohol do you have on a typical day you are drinking?  0 Filed at: 05/23/2025 1449   3a. Male UNDER 65: How often do you have five or more drinks on one occasion? 0 Filed at: 05/23/2025 1449   Audit-C Score 0 Filed at: 05/23/2025 1449   DEE: How many times in the past year have you...    Used an illegal drug or used a prescription medication for non-medical reasons? Never Filed at: 05/23/2025 1449                            History of Present Illness       Chief Complaint   Patient presents with    Penis /  Scrotum Problem     Pt reports last night noticing painless groin swelling, this morning woke up with reduced swelling but has become edematous again. Denies urinary sx.        Past Medical History[1]   Past Surgical History[2]   Family History[3]   Social History[4]   E-Cigarette/Vaping    E-Cigarette Use Never User       E-Cigarette/Vaping Substances      I have reviewed and agree with the history as documented.     65 yoM presenting due to R groin bulging area. Pt noticed this yesterday, states it does not hurt, denies zoey penial and testicular symptoms.  Normal BM, no dysuria. Bulge does fluctuate in size.  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        Review of Systems   Constitutional:  Negative for chills and fever.   HENT:  Negative for ear pain and sore throat.    Eyes:  Negative for pain and visual disturbance.   Respiratory:  Negative for cough and shortness of breath.    Cardiovascular:  Negative for chest pain and palpitations.   Gastrointestinal:  Negative for abdominal pain, nausea and vomiting.   Genitourinary:  Negative for dysuria, flank pain, hematuria, penile discharge, penile swelling, scrotal swelling and testicular pain.   Musculoskeletal:  Negative for arthralgias and back pain.   Skin:  Negative for color change and rash.   All other systems reviewed and are negative.          Objective       ED Triage Vitals [05/23/25 1448]   Temperature Pulse Blood Pressure Respirations SpO2 Patient Position - Orthostatic VS   98 °F (36.7 °C) 90 (!) 180/82 18 99 % Sitting      Temp Source Heart Rate Source BP Location FiO2 (%) Pain Score    Oral Monitor Right arm -- --      Vitals      Date and Time Temp Pulse SpO2 Resp BP Pain Score FACES Pain Rating User   05/23/25 1448 98 °F (36.7 °C) 90 99 % 18 180/82 -- -- RC            Physical Exam  Vitals and nursing note reviewed.   Constitutional:       General: He is not in acute distress.     Appearance: He is well-developed.   HENT:       Head: Normocephalic and atraumatic.     Eyes:      Conjunctiva/sclera: Conjunctivae normal.       Cardiovascular:      Rate and Rhythm: Normal rate and regular rhythm.      Heart sounds: No murmur heard.  Pulmonary:      Effort: Pulmonary effort is normal. No respiratory distress.      Breath sounds: Normal breath sounds.   Abdominal:      Palpations: Abdomen is soft.      Tenderness: There is no abdominal tenderness. There is no right CVA tenderness or left CVA tenderness.   Genitourinary:     Penis: Normal.       Testes: Normal.      Comments: Soft easily reducible R inguinal hernia.    Musculoskeletal:         General: No swelling.      Cervical back: Neck supple.     Skin:     General: Skin is warm and dry.      Capillary Refill: Capillary refill takes less than 2 seconds.     Neurological:      Mental Status: He is alert.     Psychiatric:         Mood and Affect: Mood normal.         Results Reviewed       None            No orders to display       Procedures    ED Medication and Procedure Management   Prior to Admission Medications   Prescriptions Last Dose Informant Patient Reported? Taking?   Accu-Chek FastClix Lancets MISC   No No   Sig: Use 1 lancet a day to check blood sugar, E11.65   Continuous Blood Gluc  (FREESTYLE JESSICA 14 DAY READER) ROSA   No No   Sig: Use reader device to check blood sugar   Continuous Blood Gluc Sensor (FreeStyle Jessica 14 Day Sensor) MISC   No No   Sig: Use 1 sensor every 14 days to check blood sugar   Flaxseed, Linseed, (FLAXSEED OIL) 1000 MG CAPS   Yes No   Sig: Take by mouth   aspirin (ECOTRIN LOW STRENGTH) 81 mg EC tablet  Self Yes No   Sig: Take 81 mg by mouth daily   atorvastatin (LIPITOR) 20 mg tablet   No No   Sig: TAKE 1 TABLET DAILY   calcium carbonate (OS-EDIE) 1250 (500 Ca) MG chewable tablet  Self Yes No   Sig: Chew 2 tablets 2 (two) times a day as needed     clotrimazole-betamethasone (LOTRISONE) 1-0.05 % cream   No No   Sig: Apply to affected area 2 times  daily prn   cyanocobalamin (VITAMIN B-12) 100 mcg tablet   Yes No   Sig: Take 1,000 mcg by mouth daily   glipiZIDE (GLUCOTROL XL) 5 mg 24 hr tablet   No No   Sig: TAKE 1 TABLET DAILY   glucose 4 g chewable tablet   No No   Sig: Chew 4 tablets (16 g total) as needed for low blood sugar   glucose blood (Accu-Chek Guide) test strip   No No   Sig: Use 1 test strip a day to check blood sugar, E11.65   guaiFENesin (MUCINEX) 600 mg 12 hr tablet   Yes No   Sig: Take 1,200 mg by mouth every 12 (twelve) hours   ketoconazole (NIZORAL) 2 % cream   No No   Sig: Apply to face twice a day when flared, use twice a week when not flared   metFORMIN (GLUCOPHAGE) 500 mg tablet   No No   Sig: TAKE 2 TABLETS TWO TIMES A DAY WITH MEALS   sitaGLIPtin (JANUVIA) 100 mg tablet   No No   Sig: Take 1 tablet (100 mg total) by mouth daily   Patient not taking: Reported on 7/19/2021   triamcinolone (KENALOG) 0.1 % ointment   No No   Sig: Apply topically 2 (two) times a day To rash      Facility-Administered Medications: None     Discharge Medication List as of 5/23/2025  3:17 PM        CONTINUE these medications which have NOT CHANGED    Details   Accu-Chek FastClix Lancets MISC Use 1 lancet a day to check blood sugar, E11.65, Normal      aspirin (ECOTRIN LOW STRENGTH) 81 mg EC tablet Take 81 mg by mouth daily, Historical Med      atorvastatin (LIPITOR) 20 mg tablet TAKE 1 TABLET DAILY, Normal      calcium carbonate (OS-EDIE) 1250 (500 Ca) MG chewable tablet Chew 2 tablets 2 (two) times a day as needed  , Historical Med      clotrimazole-betamethasone (LOTRISONE) 1-0.05 % cream Apply to affected area 2 times daily prn, Normal      Continuous Blood Gluc  (FREESTYLE JESSICA 14 DAY READER) ROSA Use reader device to check blood sugar, Normal      Continuous Blood Gluc Sensor (FreeStyle Jessica 14 Day Sensor) MISC Use 1 sensor every 14 days to check blood sugar, Normal      cyanocobalamin (VITAMIN B-12) 100 mcg tablet Take 1,000 mcg by mouth daily,  Historical Med      Flaxseed, Linseed, (FLAXSEED OIL) 1000 MG CAPS Take by mouth, Historical Med      glipiZIDE (GLUCOTROL XL) 5 mg 24 hr tablet TAKE 1 TABLET DAILY, Normal      glucose 4 g chewable tablet Chew 4 tablets (16 g total) as needed for low blood sugar, Starting Fri 5/29/2020, Normal      glucose blood (Accu-Chek Guide) test strip Use 1 test strip a day to check blood sugar, E11.65, Normal      guaiFENesin (MUCINEX) 600 mg 12 hr tablet Take 1,200 mg by mouth every 12 (twelve) hours, Historical Med      ketoconazole (NIZORAL) 2 % cream Apply to face twice a day when flared, use twice a week when not flared, Normal      metFORMIN (GLUCOPHAGE) 500 mg tablet TAKE 2 TABLETS TWO TIMES A DAY WITH MEALS, Normal      sitaGLIPtin (JANUVIA) 100 mg tablet Take 1 tablet (100 mg total) by mouth daily, Starting Tue 5/4/2021, Normal      triamcinolone (KENALOG) 0.1 % ointment Apply topically 2 (two) times a day To rash, Starting Tue 12/31/2019, Normal             ED SEPSIS DOCUMENTATION   Time reflects when diagnosis was documented in both MDM as applicable and the Disposition within this note       Time User Action Codes Description Comment    5/23/2025  3:16 PM Kvng Silverio Add [K40.90] Inguinal hernia                    [1]   Past Medical History:  Diagnosis Date    Diabetes mellitus (HCC)    [2]   Past Surgical History:  Procedure Laterality Date    FRACTURE SURGERY     [3]   Family History  Problem Relation Name Age of Onset    Diabetes Mother      Diabetes Sister      Diabetes Brother      Pancreatic cancer Sister      Aneurysm Sister          vertebral artery aneurysm   [4]   Social History  Tobacco Use    Smoking status: Former     Types: Cigarettes    Smokeless tobacco: Never   Vaping Use    Vaping status: Never Used   Substance Use Topics    Alcohol use: No     Comment: 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

## 2025-05-23 NOTE — ED ATTENDING ATTESTATION
5/23/2025  IJan DO, saw and evaluated the patient. I have discussed the patient with the resident/non-physician practitioner and agree with the resident's/non-physician practitioner's findings, Plan of Care, and MDM as documented in the resident's/non-physician practitioner's note, except where noted. All available labs and Radiology studies were reviewed.  I was present for key portions of any procedure(s) performed by the resident/non-physician practitioner and I was immediately available to provide assistance.       At this point I agree with the current assessment done in the Emergency Department.  I have conducted an independent evaluation of this patient a history and physical is as follows:          1. Inguinal hernia            MDM  Number of Diagnoses or Management Options  Inguinal hernia  Diagnosis management comments:       Initial ED assessment:   Right inguinal hernia, freely reducible with no evidence of incarceration and strangulation    Pathology at risk for includes but is not limited to:    Inguinal hernia.  No evidence of incarceration strangulation.    ED plan:    Right inguinal hernia, freely reducible.  Patient discharged will follow with outpatient surgery for consideration of repair.                   Time reflects when diagnosis was documented in both MDM as applicable and the Disposition within this note       Time User Action Codes Description Comment    5/23/2025  3:16 PM Kvng Silverio Add [K40.90] Inguinal hernia           ED Disposition       ED Disposition   Discharge    Condition   Stable    Date/Time   Fri May 23, 2025  3:16 PM    Comment   Kian A Quick discharge to home/self care.                   Follow-up Information       Follow up With Specialties Details Why Contact Info Additional Information    Manjinder Pan MD Internal Medicine   [address] [place] [address]  [address] [place] [address]  [phone]        [facility] Emergency Department Emergency  Medicine   [address] [facility] [place] [address]  [phone] [facility] Emergency Department, [address] [facility], [place], [place], [address]    [facility] General Surgery   [address]  [facility] [address] [phone] [facility], [address], [place], [address]                          Chief Complaint   Patient presents with    Penis / Scrotum Problem     Pt reports last night noticing painless groin swelling, this morning woke up with reduced swelling but has become edematous again. Denies urinary sx.                  65-year-old male, right inguinal discomfort.  Fullness in the area.  First noticed it earlier today.,  Denies any true pain just describes a fullness.,  States he has never had this before.  No pain into the scrotum no penile discharge no testicular pain.  No falls or injury.  Patient does have a lot of heavy lifting with his job but denies any specific injury that occurred at work.      Penis / Scrotum Problem                Visit Vitals  BP (!) 180/82 (BP Location: Right arm)   Pulse 90   Temp 98 °F (36.7 °C) (Oral)   Resp 18   SpO2 99%   Smoking Status Former        Physical Exam  Vitals reviewed.   Constitutional:       General: He is not in acute distress.     Appearance: He is well-developed. He is not diaphoretic.   HENT:      Head: Normocephalic and atraumatic.      Right Ear: External ear normal.      Left Ear: External ear normal.      Nose: Nose normal.     Eyes:      General:         Right eye: No discharge.         Left eye: No discharge.      Pupils: Pupils are equal, round, and reactive to light.     Neck:      Trachea: No tracheal deviation.     Cardiovascular:      Rate and Rhythm: Normal rate and regular rhythm.      Heart sounds: Normal heart sounds. No murmur heard.  Pulmonary:      Effort: Pulmonary effort is normal. No respiratory  distress.      Breath sounds: Normal breath sounds. No stridor.   Abdominal:      General: There is no distension.      Palpations: Abdomen is soft.      Tenderness: There is no abdominal tenderness. There is no guarding or rebound.      Comments: Right inguinal easily reducible hernia.     Musculoskeletal:         General: Normal range of motion.      Cervical back: Normal range of motion and neck supple.     Skin:     General: Skin is warm and dry.      Coloration: Skin is not pale.      Findings: No erythema.     Neurological:      General: No focal deficit present.      Mental Status: He is alert and oriented to person, place, and time.                       Medications - No data to display          Labs Reviewed - No data to display      No orders to display                  Procedures

## 2025-05-28 ENCOUNTER — CONSULT (OUTPATIENT)
Dept: SURGERY | Facility: CLINIC | Age: 65
End: 2025-05-28
Attending: EMERGENCY MEDICINE
Payer: COMMERCIAL

## 2025-05-28 VITALS
BODY MASS INDEX: 23.8 KG/M2 | OXYGEN SATURATION: 97 % | WEIGHT: 185.4 LBS | DIASTOLIC BLOOD PRESSURE: 83 MMHG | HEART RATE: 68 BPM | TEMPERATURE: 97.7 F | SYSTOLIC BLOOD PRESSURE: 168 MMHG

## 2025-05-28 DIAGNOSIS — K40.90 RIGHT INGUINAL HERNIA: ICD-10-CM

## 2025-05-28 DIAGNOSIS — E11.9 TYPE 2 DIABETES MELLITUS WITHOUT COMPLICATION, WITHOUT LONG-TERM CURRENT USE OF INSULIN (HCC): Primary | ICD-10-CM

## 2025-05-28 DIAGNOSIS — K40.90 RIGHT INGUINAL HERNIA: Primary | ICD-10-CM

## 2025-05-28 PROCEDURE — 99243 OFF/OP CNSLTJ NEW/EST LOW 30: CPT | Performed by: SURGERY

## 2025-05-28 NOTE — PROGRESS NOTES
Name: Kian Covington      : 1960      MRN: [id number]  Encounter Provider: Tatianna Hernandez MD  Encounter Date: 2025   [facility]  :  Assessment & Plan  Type 2 diabetes mellitus without complication, without long-term current use of insulin (HCC)  Relatively well controlled.  I discussed the importance of maintaining good glucose control in the perioperative period to promote wound healing and to decrease in risk of infection.  Lab Results   Component Value Date    HGBA1C 6.9 (H) 2024            Right inguinal hernia  Symptomatic and likely secondary to heavy lifting from work.  I have offered open right inguinal hernia repair with mesh. The risks, benefits and alternatives were discussed with the patient, including bleeding, infection, injury to nearby structures (vessels, ilioinguinal nerve, and spermatic vessels) and risk of recurrence.  He discussed the measures needed to decrease the associated risks including continued good glucose control.  We also discussed that I commonly intentionally ligate the ilioinguinal nerve at time of operation to decrease the risk of postoperative chronic neuralgia.  All questions answered. Consent electronically signed.             History of Present Illness   Kian Covington is a 65 y.o. male who presents for evaluation of a right inguinal hernia.  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  Patient reports a new onset of a right groin bulge that began last week.  He was evaluated in the ED on  and diagnosed with a right middle hernia.  Patient reports discomfort with this bulge and states that the bulge occurs almost immediately whenever standing and walking around.  It does reduce spontaneously when lying down.  He denies any associated issues with bowel or bladder habits and denies any nausea vomiting.  HPI  Review of Systems as  per HPI.  Past Medical History   Past Medical History[1]  Past Surgical History[2]  Family History[3]   reports that he has quit smoking. His smoking use included cigarettes. He has never used smokeless tobacco. He reports that he does not drink alcohol and does not use drugs.  Current Outpatient Medications   Medication Instructions   • Accu-Chek FastClix Lancets MISC Use 1 lancet a day to check blood sugar, E11.65   • aspirin (ECOTRIN LOW STRENGTH) 81 mg, Oral, Daily   • atorvastatin (LIPITOR) 20 mg tablet TAKE 1 TABLET DAILY   • calcium carbonate (OS-EDIE) 1250 (500 Ca) MG chewable tablet 2 tablets, Oral, 2 times daily PRN   • clotrimazole-betamethasone (LOTRISONE) 1-0.05 % cream Apply to affected area 2 times daily prn   • Continuous Blood Gluc  (FREESTYLE JESSICA 14 DAY READER) ROSA Use reader device to check blood sugar   • Continuous Blood Gluc Sensor (FreeStyle Jessica 14 Day Sensor) MISC Use 1 sensor every 14 days to check blood sugar   • cyanocobalamin (VITAMIN B-12) 1,000 mcg, Oral, Daily   • Flaxseed, Linseed, (FLAXSEED OIL) 1000 MG CAPS Oral   • glipiZIDE (GLUCOTROL XL) 5 mg 24 hr tablet TAKE 1 TABLET DAILY   • glucose blood (Accu-Chek Guide) test strip Use 1 test strip a day to check blood sugar, E11.65   • glucose 16 g, Oral, As needed   • guaiFENesin (MUCINEX) 1,200 mg, Oral, Every 12 hours scheduled   • ketoconazole (NIZORAL) 2 % cream Apply to face twice a day when flared, use twice a week when not flared   • metFORMIN (GLUCOPHAGE) 500 mg tablet TAKE 2 TABLETS TWO TIMES A DAY WITH MEALS   • sitaGLIPtin (JANUVIA) 100 mg, Oral, Daily   • triamcinolone (KENALOG) 0.1 % ointment Topical, 2 times daily, To rash   Allergies[4]      Objective   /83 (BP Location: Left arm, Patient Position: Sitting, Cuff Size: Standard)   Pulse 68   Temp 97.7 °F (36.5 °C) (Temporal)   Wt 84.1 kg (185 lb 6.4 oz)   SpO2 97%   BMI 23.80 kg/m²      Physical Exam  Vitals and nursing note reviewed.   Constitutional:        General: He is not in acute distress.     Appearance: He is well-developed. He is not diaphoretic.   HENT:      Head: Normocephalic and atraumatic.      Right Ear: External ear normal.      Left Ear: External ear normal.     Eyes:      General: No scleral icterus.     Conjunctiva/sclera: Conjunctivae normal.      Pupils: Pupils are equal, round, and reactive to light.     Neck:      Thyroid: No thyromegaly.      Trachea: No tracheal deviation.     Cardiovascular:      Rate and Rhythm: Normal rate and regular rhythm.      Heart sounds: Normal heart sounds. No murmur heard.     No friction rub. No gallop.   Pulmonary:      Effort: Pulmonary effort is normal. No respiratory distress.      Breath sounds: Normal breath sounds. No stridor. No wheezing or rales.   Abdominal:      General: There is no distension.      Palpations: Abdomen is soft.      Tenderness: There is no guarding or rebound.   Genitourinary:     Penis: Normal.       Comments: Large right inguinal hernia that is reducible.  No overlying skin changes.  No evidence of a left inguinal hernia.    Musculoskeletal:         General: Normal range of motion.      Cervical back: Normal range of motion and neck supple.     Skin:     General: Skin is warm and dry.      Capillary Refill: Capillary refill takes less than 2 seconds.     Neurological:      General: No focal deficit present.      Mental Status: He is alert and oriented to person, place, and time. Mental status is at baseline.     Psychiatric:         Behavior: Behavior normal.         Thought Content: Thought content normal.         Judgment: Judgment normal.                      [1]  Past Medical History:  Diagnosis Date   • Diabetes mellitus (HCC)    [2]  Past Surgical History:  Procedure Laterality Date   • FRACTURE SURGERY     [3]  Family History  Problem Relation Name Age of Onset   • Diabetes Mother     • Diabetes Sister     • Diabetes Brother     • Pancreatic cancer Sister     • Aneurysm  Sister          vertebral artery aneurysm   [4]  Allergies  Allergen Reactions   • Delsym [Dextromethorphan] Seizures   • Epinephrine Other (See Comments)     Had syncopal episode, with possible seizure like activity; had injection for biopsies   • Lidocaine Other (See Comments)     Had syncopal episode, with possible seizure like activity; had injection for biopsies

## 2025-05-28 NOTE — ASSESSMENT & PLAN NOTE
Relatively well controlled.  I discussed the importance of maintaining good glucose control in the perioperative period to promote wound healing and to decrease in risk of infection.  Lab Results   Component Value Date    HGBA1C 6.9 (H) 09/28/2024

## 2025-05-28 NOTE — ASSESSMENT & PLAN NOTE
Symptomatic and likely secondary to heavy lifting from work.  I have offered open right inguinal hernia repair with mesh. The risks, benefits and alternatives were discussed with the patient, including bleeding, infection, injury to nearby structures (vessels, ilioinguinal nerve, and spermatic vessels) and risk of recurrence.  He discussed the measures needed to decrease the associated risks including continued good glucose control.  We also discussed that I commonly intentionally ligate the ilioinguinal nerve at time of operation to decrease the risk of postoperative chronic neuralgia.  All questions answered. Consent electronically signed.

## 2025-05-29 ENCOUNTER — APPOINTMENT (OUTPATIENT)
Dept: URGENT CARE | Facility: MEDICAL CENTER | Age: 65
End: 2025-05-29
Payer: OTHER MISCELLANEOUS

## 2025-05-29 DIAGNOSIS — Z41.9 SURGERY, ELECTIVE: Primary | ICD-10-CM

## 2025-05-29 PROCEDURE — 99213 OFFICE O/P EST LOW 20 MIN: CPT | Performed by: NURSE PRACTITIONER

## 2025-05-29 RX ORDER — SODIUM CHLORIDE, SODIUM LACTATE, POTASSIUM CHLORIDE, CALCIUM CHLORIDE 600; 310; 30; 20 MG/100ML; MG/100ML; MG/100ML; MG/100ML
20 INJECTION, SOLUTION INTRAVENOUS CONTINUOUS
OUTPATIENT
Start: 2025-05-29